# Patient Record
Sex: FEMALE | Race: WHITE | NOT HISPANIC OR LATINO | URBAN - METROPOLITAN AREA
[De-identification: names, ages, dates, MRNs, and addresses within clinical notes are randomized per-mention and may not be internally consistent; named-entity substitution may affect disease eponyms.]

---

## 2018-12-01 ENCOUNTER — EMERGENCY (EMERGENCY)
Facility: HOSPITAL | Age: 83
LOS: 1 days | Discharge: HOME | End: 2018-12-01
Attending: EMERGENCY MEDICINE | Admitting: EMERGENCY MEDICINE
Payer: MEDICARE

## 2018-12-01 VITALS
SYSTOLIC BLOOD PRESSURE: 197 MMHG | RESPIRATION RATE: 20 BRPM | HEART RATE: 70 BPM | TEMPERATURE: 98 F | OXYGEN SATURATION: 92 % | DIASTOLIC BLOOD PRESSURE: 84 MMHG

## 2018-12-01 DIAGNOSIS — Z79.52 LONG TERM (CURRENT) USE OF SYSTEMIC STEROIDS: ICD-10-CM

## 2018-12-01 DIAGNOSIS — Z90.12 ACQUIRED ABSENCE OF LEFT BREAST AND NIPPLE: Chronic | ICD-10-CM

## 2018-12-01 DIAGNOSIS — Z79.899 OTHER LONG TERM (CURRENT) DRUG THERAPY: ICD-10-CM

## 2018-12-01 DIAGNOSIS — S09.90XA UNSPECIFIED INJURY OF HEAD, INITIAL ENCOUNTER: ICD-10-CM

## 2018-12-01 DIAGNOSIS — Z88.8 ALLERGY STATUS TO OTHER DRUGS, MEDICAMENTS AND BIOLOGICAL SUBSTANCES: ICD-10-CM

## 2018-12-01 DIAGNOSIS — Z91.040 LATEX ALLERGY STATUS: ICD-10-CM
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
## 2018-12-01 DIAGNOSIS — E03.9 HYPOTHYROIDISM, UNSPECIFIED: ICD-10-CM

## 2018-12-01 DIAGNOSIS — Z92.3 PERSONAL HISTORY OF IRRADIATION: Chronic | ICD-10-CM

## 2018-12-01 DIAGNOSIS — I10 ESSENTIAL (PRIMARY) HYPERTENSION: ICD-10-CM
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
## 2018-12-01 DIAGNOSIS — E78.00 PURE HYPERCHOLESTEROLEMIA, UNSPECIFIED: ICD-10-CM
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
## 2018-12-01 DIAGNOSIS — Z90.49 ACQUIRED ABSENCE OF OTHER SPECIFIED PARTS OF DIGESTIVE TRACT: Chronic | ICD-10-CM
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
## 2018-12-01 PROCEDURE — 70486 CT MAXILLOFACIAL W/O DYE: CPT | Mod: 26

## 2018-12-01 PROCEDURE — 70450 CT HEAD/BRAIN W/O DYE: CPT

## 2018-12-01 PROCEDURE — 99284 EMERGENCY DEPT VISIT MOD MDM: CPT

## 2018-12-01 PROCEDURE — 90715 TDAP VACCINE 7 YRS/> IM: CPT

## 2018-12-01 PROCEDURE — 73562 X-RAY EXAM OF KNEE 3: CPT | Mod: 26,LT

## 2018-12-01 PROCEDURE — 70450 CT HEAD/BRAIN W/O DYE: CPT | Mod: 26

## 2018-12-01 PROCEDURE — 90471 IMMUNIZATION ADMIN: CPT

## 2018-12-01 PROCEDURE — 73562 X-RAY EXAM OF KNEE 3: CPT

## 2018-12-01 PROCEDURE — 99284 EMERGENCY DEPT VISIT MOD MDM: CPT | Mod: 25

## 2018-12-01 PROCEDURE — 70486 CT MAXILLOFACIAL W/O DYE: CPT

## 2018-12-01 RX ORDER — TETANUS TOXOID, REDUCED DIPHTHERIA TOXOID AND ACELLULAR PERTUSSIS VACCINE, ADSORBED 5; 2.5; 8; 8; 2.5 [IU]/.5ML; [IU]/.5ML; UG/.5ML; UG/.5ML; UG/.5ML
0.5 SUSPENSION INTRAMUSCULAR ONCE
Qty: 0 | Refills: 0 | Status: COMPLETED | OUTPATIENT
Start: 2018-12-01 | End: 2018-12-01

## 2018-12-01 RX ADMIN — TETANUS TOXOID, REDUCED DIPHTHERIA TOXOID AND ACELLULAR PERTUSSIS VACCINE, ADSORBED 0.5 MILLILITER(S): 5; 2.5; 8; 8; 2.5 SUSPENSION INTRAMUSCULAR at 17:16

## 2018-12-01 NOTE — ED ADULT NURSE REASSESSMENT NOTE - NS ED NURSE REASSESS COMMENT FT1
Patient refusing discharge vital signs, stating she is dressed and ready to leave and has been waiting for her discharge. Awake and alert, ambulating at discharge.

## 2018-12-01 NOTE — ED PROVIDER NOTE - OBJECTIVE STATEMENT
Patient is an 84yo female reporting mechanical fall on sidewalk today. Denies LOC, on Coumadin. Hematoma noted to left side of forehead and under left eye. Small laceration noted to forehead and left knee. Denies chest pain, shortness of breath, vomiting. AOx4, PERRL 84 yo hx of afib on coumadin s/p mechanical fall on sidewalk today. Denies LOC, Hematoma noted to left side of forehead and under left eye. Small abrasion noted to forehead and left knee. Denies chest pain, shortness of breath, vomiting. no other injuries, no neck pain, back pain, ambulatory at scene. Pt has not tried anything for symptoms, no other aggravating or relieving factors.

## 2018-12-01 NOTE — ED ADULT NURSE NOTE - INTERVENTIONS DEFINITIONS
Physically safe environment: no spills, clutter or unnecessary equipment/Stretcher in lowest position, wheels locked, appropriate side rails in place/Provide visual cue, wrist band, yellow gown, etc./Non-slip footwear when patient is off stretcher/Instruct patient to call for assistance

## 2018-12-01 NOTE — ED PROVIDER NOTE - PHYSICAL EXAMINATION
CONSTITUTIONAL: Well appearing, well nourished, awake, alert and in no apparent distress.  HEENT: Head is atraumatic. Eyes clear bilaterally, normal EOMI. Airway patent.  CARDIAC: Normal rate, regular rhythm.  Heart sounds S1, S2.   RESPIRATORY: Breath sounds clear and equal bilaterally. no tachypnea, respiratory distress.   GASTROINTESTINAL: Abdomen soft, non-tender, no guarding, distension.  MUSCULOSKELETAL: Spine appears normal, no midline spinal tenderness, range of motion is not limited, no muscle or joint tenderness. no bony tenderness. no JVD, peripheral edema.   NEUROLOGICAL: Alert and oriented, no focal deficits, no motor or sensory deficits.  SKIN: Skin normal color for race, warm, dry and intact. No evidence of rash.  PSYCHIATRIC: Alert and oriented to person, place, time/situation. normal mood and affect. no apparent risk to self or others. CONSTITUTIONAL: Well appearing, well nourished, awake, alert and in no apparent distress.  HEENT: Head is atraumatic. Eyes clear bilaterally, normal EOMI. Airway patent.  CARDIAC: Normal rate, regular rhythm.  Heart sounds S1, S2.   RESPIRATORY: Breath sounds clear and equal bilaterally. no tachypnea, respiratory distress.   GASTROINTESTINAL: Abdomen soft, non-tender, no guarding, distension.  MUSCULOSKELETAL: Spine appears normal, no midline spinal tenderness, range of motion is not limited, no muscle or joint tenderness. no bony tenderness. 1cm superficial forehead abrasion, abrasion to L knee   NEUROLOGICAL: Alert and oriented, no focal deficits, no motor or sensory deficits.  SKIN: Skin normal color for race, warm, dry and intact. No evidence of rash.  PSYCHIATRIC: Alert and oriented to person, place, time/situation. normal mood and affect. no apparent risk to self or others.

## 2018-12-01 NOTE — ED ADULT TRIAGE NOTE - OTHER COMPLAINTS
+head injury, pt is on coumadin, no LOC. Pt C/O pain to left knee and head. No vomiting, no vision change. Pt is A&O x3, able to speak coherently.

## 2018-12-01 NOTE — ED ADULT NURSE NOTE - OBJECTIVE STATEMENT
Patient is an 86yo female reporting mechanical fall on sidewalk today. Denies LOC, on Coumadin. Hematoma noted to left side of forehead and under left eye. Small laceration noted to forehead and left knee. Denies chest pain, shortness of breath, vomiting. AOx4, PERRL.

## 2019-01-01 ENCOUNTER — INPATIENT (INPATIENT)
Facility: HOSPITAL | Age: 84
LOS: 1 days | Discharge: HOME HEALTH | DRG: 312 | End: 2019-08-20
Attending: SPECIALIST | Admitting: SPECIALIST
Payer: MEDICARE

## 2019-01-01 ENCOUNTER — INPATIENT (INPATIENT)
Facility: HOSPITAL | Age: 84
LOS: 2 days | Discharge: HOME HEALTH | DRG: 312 | End: 2019-08-24
Attending: SPECIALIST | Admitting: SPECIALIST
Payer: MEDICARE

## 2019-01-01 VITALS
TEMPERATURE: 98 F | RESPIRATION RATE: 16 BRPM | OXYGEN SATURATION: 98 % | HEART RATE: 87 BPM | DIASTOLIC BLOOD PRESSURE: 72 MMHG | SYSTOLIC BLOOD PRESSURE: 135 MMHG

## 2019-01-01 VITALS
TEMPERATURE: 98 F | DIASTOLIC BLOOD PRESSURE: 79 MMHG | OXYGEN SATURATION: 95 % | HEART RATE: 86 BPM | RESPIRATION RATE: 16 BRPM | SYSTOLIC BLOOD PRESSURE: 124 MMHG

## 2019-01-01 VITALS — DIASTOLIC BLOOD PRESSURE: 74 MMHG | SYSTOLIC BLOOD PRESSURE: 160 MMHG

## 2019-01-01 VITALS — TEMPERATURE: 98 F

## 2019-01-01 DIAGNOSIS — E03.9 HYPOTHYROIDISM, UNSPECIFIED: ICD-10-CM

## 2019-01-01 DIAGNOSIS — R55 SYNCOPE AND COLLAPSE: ICD-10-CM

## 2019-01-01 DIAGNOSIS — I27.20 PULMONARY HYPERTENSION, UNSPECIFIED: ICD-10-CM
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
## 2019-01-01 DIAGNOSIS — Z79.899 OTHER LONG TERM (CURRENT) DRUG THERAPY: ICD-10-CM

## 2019-01-01 DIAGNOSIS — Z95.0 PRESENCE OF CARDIAC PACEMAKER: ICD-10-CM

## 2019-01-01 DIAGNOSIS — R68.84 JAW PAIN: ICD-10-CM

## 2019-01-01 DIAGNOSIS — Z91.040 LATEX ALLERGY STATUS: ICD-10-CM

## 2019-01-01 DIAGNOSIS — I10 ESSENTIAL (PRIMARY) HYPERTENSION: ICD-10-CM

## 2019-01-01 DIAGNOSIS — C50.919 MALIGNANT NEOPLASM OF UNSPECIFIED SITE OF UNSPECIFIED FEMALE BREAST: ICD-10-CM

## 2019-01-01 DIAGNOSIS — Z92.3 PERSONAL HISTORY OF IRRADIATION: Chronic | ICD-10-CM

## 2019-01-01 DIAGNOSIS — Z90.12 ACQUIRED ABSENCE OF LEFT BREAST AND NIPPLE: ICD-10-CM
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
## 2019-01-01 DIAGNOSIS — Z90.12 ACQUIRED ABSENCE OF LEFT BREAST AND NIPPLE: Chronic | ICD-10-CM

## 2019-01-01 DIAGNOSIS — I36.1 NONRHEUMATIC TRICUSPID (VALVE) INSUFFICIENCY: ICD-10-CM

## 2019-01-01 DIAGNOSIS — Z90.49 ACQUIRED ABSENCE OF OTHER SPECIFIED PARTS OF DIGESTIVE TRACT: Chronic | ICD-10-CM

## 2019-01-01 DIAGNOSIS — I44.0 ATRIOVENTRICULAR BLOCK, FIRST DEGREE: ICD-10-CM

## 2019-01-01 DIAGNOSIS — I07.1 RHEUMATIC TRICUSPID INSUFFICIENCY: ICD-10-CM

## 2019-01-01 DIAGNOSIS — I48.2 CHRONIC ATRIAL FIBRILLATION: ICD-10-CM

## 2019-01-01 DIAGNOSIS — Z85.3 PERSONAL HISTORY OF MALIGNANT NEOPLASM OF BREAST: ICD-10-CM

## 2019-01-01 DIAGNOSIS — F32.9 MAJOR DEPRESSIVE DISORDER, SINGLE EPISODE, UNSPECIFIED: ICD-10-CM

## 2019-01-01 DIAGNOSIS — I31.3 PERICARDIAL EFFUSION (NONINFLAMMATORY): ICD-10-CM

## 2019-01-01 DIAGNOSIS — Z87.891 PERSONAL HISTORY OF NICOTINE DEPENDENCE: ICD-10-CM

## 2019-01-01 DIAGNOSIS — Z91.09 OTHER ALLERGY STATUS, OTHER THAN TO DRUGS AND BIOLOGICAL SUBSTANCES: ICD-10-CM

## 2019-01-01 DIAGNOSIS — Z79.811 LONG TERM (CURRENT) USE OF AROMATASE INHIBITORS: ICD-10-CM

## 2019-01-01 DIAGNOSIS — I95.1 ORTHOSTATIC HYPOTENSION: ICD-10-CM

## 2019-01-01 DIAGNOSIS — Z79.01 LONG TERM (CURRENT) USE OF ANTICOAGULANTS: ICD-10-CM

## 2019-01-01 DIAGNOSIS — E78.5 HYPERLIPIDEMIA, UNSPECIFIED: ICD-10-CM

## 2019-01-01 DIAGNOSIS — E78.00 PURE HYPERCHOLESTEROLEMIA, UNSPECIFIED: ICD-10-CM

## 2019-01-01 DIAGNOSIS — I48.91 UNSPECIFIED ATRIAL FIBRILLATION: ICD-10-CM

## 2019-01-01 DIAGNOSIS — R29.6 REPEATED FALLS: ICD-10-CM

## 2019-01-01 DIAGNOSIS — M10.9 GOUT, UNSPECIFIED: ICD-10-CM

## 2019-01-01 DIAGNOSIS — Z98.890 OTHER SPECIFIED POSTPROCEDURAL STATES: ICD-10-CM
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
## 2019-01-01 LAB
ALBUMIN SERPL ELPH-MCNC: 3.7 G/DL — SIGNIFICANT CHANGE UP (ref 3.3–5)
ALBUMIN SERPL ELPH-MCNC: 3.7 G/DL — SIGNIFICANT CHANGE UP (ref 3.3–5)
ALP SERPL-CCNC: 73 U/L — SIGNIFICANT CHANGE UP (ref 40–120)
ALP SERPL-CCNC: 78 U/L — SIGNIFICANT CHANGE UP (ref 40–120)
ALT FLD-CCNC: 14 U/L — SIGNIFICANT CHANGE UP (ref 10–45)
ALT FLD-CCNC: 14 U/L — SIGNIFICANT CHANGE UP (ref 10–45)
ANION GAP SERPL CALC-SCNC: 10 MMOL/L — SIGNIFICANT CHANGE UP (ref 5–17)
ANION GAP SERPL CALC-SCNC: 10 MMOL/L — SIGNIFICANT CHANGE UP (ref 5–17)
ANION GAP SERPL CALC-SCNC: 11 MMOL/L — SIGNIFICANT CHANGE UP (ref 5–17)
ANION GAP SERPL CALC-SCNC: 7 MMOL/L — SIGNIFICANT CHANGE UP (ref 5–17)
ANION GAP SERPL CALC-SCNC: 8 MMOL/L — SIGNIFICANT CHANGE UP (ref 5–17)
ANION GAP SERPL CALC-SCNC: 8 MMOL/L — SIGNIFICANT CHANGE UP (ref 5–17)
ANION GAP SERPL CALC-SCNC: 9 MMOL/L — SIGNIFICANT CHANGE UP (ref 5–17)
APPEARANCE UR: CLEAR — SIGNIFICANT CHANGE UP
APTT BLD: 37 SEC — HIGH (ref 27.5–36.3)
APTT BLD: 37.6 SEC — HIGH (ref 27.5–36.3)
APTT BLD: 37.7 SEC — HIGH (ref 27.5–36.3)
APTT BLD: 37.9 SEC — HIGH (ref 27.5–36.3)
APTT BLD: 38.7 SEC — HIGH (ref 27.5–36.3)
APTT BLD: 40.3 SEC — HIGH (ref 27.5–36.3)
AST SERPL-CCNC: 21 U/L — SIGNIFICANT CHANGE UP (ref 10–40)
AST SERPL-CCNC: 23 U/L — SIGNIFICANT CHANGE UP (ref 10–40)
BASOPHILS # BLD AUTO: 0.06 K/UL — SIGNIFICANT CHANGE UP (ref 0–0.2)
BASOPHILS # BLD AUTO: 0.06 K/UL — SIGNIFICANT CHANGE UP (ref 0–0.2)
BASOPHILS NFR BLD AUTO: 0.7 % — SIGNIFICANT CHANGE UP (ref 0–2)
BASOPHILS NFR BLD AUTO: 0.8 % — SIGNIFICANT CHANGE UP (ref 0–2)
BILIRUB SERPL-MCNC: 1.4 MG/DL — HIGH (ref 0.2–1.2)
BILIRUB SERPL-MCNC: 1.8 MG/DL — HIGH (ref 0.2–1.2)
BILIRUB UR-MCNC: NEGATIVE — SIGNIFICANT CHANGE UP
BUN SERPL-MCNC: 19 MG/DL — SIGNIFICANT CHANGE UP (ref 7–23)
BUN SERPL-MCNC: 20 MG/DL — SIGNIFICANT CHANGE UP (ref 7–23)
BUN SERPL-MCNC: 22 MG/DL — SIGNIFICANT CHANGE UP (ref 7–23)
BUN SERPL-MCNC: 23 MG/DL — SIGNIFICANT CHANGE UP (ref 7–23)
BUN SERPL-MCNC: 24 MG/DL — HIGH (ref 7–23)
BUN SERPL-MCNC: 25 MG/DL — HIGH (ref 7–23)
BUN SERPL-MCNC: 29 MG/DL — HIGH (ref 7–23)
CALCIUM SERPL-MCNC: 10 MG/DL — SIGNIFICANT CHANGE UP (ref 8.4–10.5)
CALCIUM SERPL-MCNC: 10.2 MG/DL — SIGNIFICANT CHANGE UP (ref 8.4–10.5)
CALCIUM SERPL-MCNC: 10.3 MG/DL — SIGNIFICANT CHANGE UP (ref 8.4–10.5)
CALCIUM SERPL-MCNC: 10.6 MG/DL — HIGH (ref 8.4–10.5)
CALCIUM SERPL-MCNC: 10.6 MG/DL — HIGH (ref 8.4–10.5)
CALCIUM SERPL-MCNC: 10.9 MG/DL — HIGH (ref 8.4–10.5)
CALCIUM SERPL-MCNC: 9.7 MG/DL — SIGNIFICANT CHANGE UP (ref 8.4–10.5)
CHLORIDE SERPL-SCNC: 102 MMOL/L — SIGNIFICANT CHANGE UP (ref 96–108)
CHLORIDE SERPL-SCNC: 104 MMOL/L — SIGNIFICANT CHANGE UP (ref 96–108)
CHLORIDE SERPL-SCNC: 104 MMOL/L — SIGNIFICANT CHANGE UP (ref 96–108)
CHLORIDE SERPL-SCNC: 105 MMOL/L — SIGNIFICANT CHANGE UP (ref 96–108)
CHLORIDE SERPL-SCNC: 105 MMOL/L — SIGNIFICANT CHANGE UP (ref 96–108)
CHLORIDE SERPL-SCNC: 106 MMOL/L — SIGNIFICANT CHANGE UP (ref 96–108)
CHLORIDE SERPL-SCNC: 106 MMOL/L — SIGNIFICANT CHANGE UP (ref 96–108)
CK MB CFR SERPL CALC: 1.4 NG/ML — SIGNIFICANT CHANGE UP (ref 0–6.7)
CK SERPL-CCNC: 40 U/L — SIGNIFICANT CHANGE UP (ref 25–170)
CO2 SERPL-SCNC: 25 MMOL/L — SIGNIFICANT CHANGE UP (ref 22–31)
CO2 SERPL-SCNC: 26 MMOL/L — SIGNIFICANT CHANGE UP (ref 22–31)
CO2 SERPL-SCNC: 26 MMOL/L — SIGNIFICANT CHANGE UP (ref 22–31)
CO2 SERPL-SCNC: 27 MMOL/L — SIGNIFICANT CHANGE UP (ref 22–31)
CO2 SERPL-SCNC: 27 MMOL/L — SIGNIFICANT CHANGE UP (ref 22–31)
CO2 SERPL-SCNC: 28 MMOL/L — SIGNIFICANT CHANGE UP (ref 22–31)
CO2 SERPL-SCNC: 30 MMOL/L — SIGNIFICANT CHANGE UP (ref 22–31)
COLOR SPEC: YELLOW — SIGNIFICANT CHANGE UP
CREAT ?TM UR-MCNC: 99 MG/DL — SIGNIFICANT CHANGE UP
CREAT SERPL-MCNC: 0.95 MG/DL — SIGNIFICANT CHANGE UP (ref 0.5–1.3)
CREAT SERPL-MCNC: 0.96 MG/DL — SIGNIFICANT CHANGE UP (ref 0.5–1.3)
CREAT SERPL-MCNC: 0.97 MG/DL — SIGNIFICANT CHANGE UP (ref 0.5–1.3)
CREAT SERPL-MCNC: 1.01 MG/DL — SIGNIFICANT CHANGE UP (ref 0.5–1.3)
CREAT SERPL-MCNC: 1.06 MG/DL — SIGNIFICANT CHANGE UP (ref 0.5–1.3)
CREAT SERPL-MCNC: 1.07 MG/DL — SIGNIFICANT CHANGE UP (ref 0.5–1.3)
CREAT SERPL-MCNC: 1.26 MG/DL — SIGNIFICANT CHANGE UP (ref 0.5–1.3)
DIFF PNL FLD: NEGATIVE — SIGNIFICANT CHANGE UP
EOSINOPHIL # BLD AUTO: 0.26 K/UL — SIGNIFICANT CHANGE UP (ref 0–0.5)
EOSINOPHIL # BLD AUTO: 0.27 K/UL — SIGNIFICANT CHANGE UP (ref 0–0.5)
EOSINOPHIL NFR BLD AUTO: 3.2 % — SIGNIFICANT CHANGE UP (ref 0–6)
EOSINOPHIL NFR BLD AUTO: 3.3 % — SIGNIFICANT CHANGE UP (ref 0–6)
EXTRA LAVENDER TOP TUBE: SIGNIFICANT CHANGE UP
EXTRA SST TUBE: SIGNIFICANT CHANGE UP
GLUCOSE SERPL-MCNC: 100 MG/DL — HIGH (ref 70–99)
GLUCOSE SERPL-MCNC: 102 MG/DL — HIGH (ref 70–99)
GLUCOSE SERPL-MCNC: 118 MG/DL — HIGH (ref 70–99)
GLUCOSE SERPL-MCNC: 84 MG/DL — SIGNIFICANT CHANGE UP (ref 70–99)
GLUCOSE SERPL-MCNC: 84 MG/DL — SIGNIFICANT CHANGE UP (ref 70–99)
GLUCOSE SERPL-MCNC: 94 MG/DL — SIGNIFICANT CHANGE UP (ref 70–99)
GLUCOSE SERPL-MCNC: 95 MG/DL — SIGNIFICANT CHANGE UP (ref 70–99)
GLUCOSE UR QL: NEGATIVE — SIGNIFICANT CHANGE UP
HCT VFR BLD CALC: 35.1 % — SIGNIFICANT CHANGE UP (ref 34.5–45)
HCT VFR BLD CALC: 35.9 % — SIGNIFICANT CHANGE UP (ref 34.5–45)
HCT VFR BLD CALC: 35.9 % — SIGNIFICANT CHANGE UP (ref 34.5–45)
HCT VFR BLD CALC: 37 % — SIGNIFICANT CHANGE UP (ref 34.5–45)
HCT VFR BLD CALC: 37.5 % — SIGNIFICANT CHANGE UP (ref 34.5–45)
HCT VFR BLD CALC: 38.3 % — SIGNIFICANT CHANGE UP (ref 34.5–45)
HCT VFR BLD CALC: 39.5 % — SIGNIFICANT CHANGE UP (ref 34.5–45)
HGB BLD-MCNC: 11.5 G/DL — SIGNIFICANT CHANGE UP (ref 11.5–15.5)
HGB BLD-MCNC: 11.5 G/DL — SIGNIFICANT CHANGE UP (ref 11.5–15.5)
HGB BLD-MCNC: 11.7 G/DL — SIGNIFICANT CHANGE UP (ref 11.5–15.5)
HGB BLD-MCNC: 12 G/DL — SIGNIFICANT CHANGE UP (ref 11.5–15.5)
HGB BLD-MCNC: 12.2 G/DL — SIGNIFICANT CHANGE UP (ref 11.5–15.5)
HGB BLD-MCNC: 12.4 G/DL — SIGNIFICANT CHANGE UP (ref 11.5–15.5)
HGB BLD-MCNC: 12.6 G/DL — SIGNIFICANT CHANGE UP (ref 11.5–15.5)
IMM GRANULOCYTES NFR BLD AUTO: 0.4 % — SIGNIFICANT CHANGE UP (ref 0–1.5)
IMM GRANULOCYTES NFR BLD AUTO: 0.5 % — SIGNIFICANT CHANGE UP (ref 0–1.5)
INR BLD: 2.36 — HIGH (ref 0.88–1.16)
INR BLD: 2.37 — HIGH (ref 0.88–1.16)
INR BLD: 2.53 — HIGH (ref 0.88–1.16)
INR BLD: 2.57 — HIGH (ref 0.88–1.16)
INR BLD: 2.79 — HIGH (ref 0.88–1.16)
INR BLD: 2.88 — HIGH (ref 0.88–1.16)
INR BLD: 2.95 — HIGH (ref 0.88–1.16)
KETONES UR-MCNC: NEGATIVE — SIGNIFICANT CHANGE UP
LEUKOCYTE ESTERASE UR-ACNC: NEGATIVE — SIGNIFICANT CHANGE UP
LYMPHOCYTES # BLD AUTO: 1.47 K/UL — SIGNIFICANT CHANGE UP (ref 1–3.3)
LYMPHOCYTES # BLD AUTO: 1.52 K/UL — SIGNIFICANT CHANGE UP (ref 1–3.3)
LYMPHOCYTES # BLD AUTO: 17.2 % — SIGNIFICANT CHANGE UP (ref 13–44)
LYMPHOCYTES # BLD AUTO: 19.2 % — SIGNIFICANT CHANGE UP (ref 13–44)
MAGNESIUM SERPL-MCNC: 1.8 MG/DL — SIGNIFICANT CHANGE UP (ref 1.6–2.6)
MAGNESIUM SERPL-MCNC: 1.9 MG/DL — SIGNIFICANT CHANGE UP (ref 1.6–2.6)
MAGNESIUM SERPL-MCNC: 1.9 MG/DL — SIGNIFICANT CHANGE UP (ref 1.6–2.6)
MAGNESIUM SERPL-MCNC: 2.1 MG/DL — SIGNIFICANT CHANGE UP (ref 1.6–2.6)
MAGNESIUM SERPL-MCNC: 2.2 MG/DL — SIGNIFICANT CHANGE UP (ref 1.6–2.6)
MCHC RBC-ENTMCNC: 28.7 PG — SIGNIFICANT CHANGE UP (ref 27–34)
MCHC RBC-ENTMCNC: 28.9 PG — SIGNIFICANT CHANGE UP (ref 27–34)
MCHC RBC-ENTMCNC: 29 PG — SIGNIFICANT CHANGE UP (ref 27–34)
MCHC RBC-ENTMCNC: 29.3 PG — SIGNIFICANT CHANGE UP (ref 27–34)
MCHC RBC-ENTMCNC: 29.3 PG — SIGNIFICANT CHANGE UP (ref 27–34)
MCHC RBC-ENTMCNC: 29.5 PG — SIGNIFICANT CHANGE UP (ref 27–34)
MCHC RBC-ENTMCNC: 29.5 PG — SIGNIFICANT CHANGE UP (ref 27–34)
MCHC RBC-ENTMCNC: 31.9 GM/DL — LOW (ref 32–36)
MCHC RBC-ENTMCNC: 32 GM/DL — SIGNIFICANT CHANGE UP (ref 32–36)
MCHC RBC-ENTMCNC: 32.4 GM/DL — SIGNIFICANT CHANGE UP (ref 32–36)
MCHC RBC-ENTMCNC: 32.4 GM/DL — SIGNIFICANT CHANGE UP (ref 32–36)
MCHC RBC-ENTMCNC: 32.5 GM/DL — SIGNIFICANT CHANGE UP (ref 32–36)
MCHC RBC-ENTMCNC: 32.6 GM/DL — SIGNIFICANT CHANGE UP (ref 32–36)
MCHC RBC-ENTMCNC: 32.8 GM/DL — SIGNIFICANT CHANGE UP (ref 32–36)
MCV RBC AUTO: 89.7 FL — SIGNIFICANT CHANGE UP (ref 80–100)
MCV RBC AUTO: 90 FL — SIGNIFICANT CHANGE UP (ref 80–100)
MCV RBC AUTO: 90 FL — SIGNIFICANT CHANGE UP (ref 80–100)
MCV RBC AUTO: 90.1 FL — SIGNIFICANT CHANGE UP (ref 80–100)
MCV RBC AUTO: 90.2 FL — SIGNIFICANT CHANGE UP (ref 80–100)
MCV RBC AUTO: 90.4 FL — SIGNIFICANT CHANGE UP (ref 80–100)
MCV RBC AUTO: 90.5 FL — SIGNIFICANT CHANGE UP (ref 80–100)
MONOCYTES # BLD AUTO: 0.51 K/UL — SIGNIFICANT CHANGE UP (ref 0–0.9)
MONOCYTES # BLD AUTO: 0.66 K/UL — SIGNIFICANT CHANGE UP (ref 0–0.9)
MONOCYTES NFR BLD AUTO: 6.4 % — SIGNIFICANT CHANGE UP (ref 2–14)
MONOCYTES NFR BLD AUTO: 7.7 % — SIGNIFICANT CHANGE UP (ref 2–14)
NEUTROPHILS # BLD AUTO: 5.54 K/UL — SIGNIFICANT CHANGE UP (ref 1.8–7.4)
NEUTROPHILS # BLD AUTO: 6.06 K/UL — SIGNIFICANT CHANGE UP (ref 1.8–7.4)
NEUTROPHILS NFR BLD AUTO: 69.9 % — SIGNIFICANT CHANGE UP (ref 43–77)
NEUTROPHILS NFR BLD AUTO: 70.7 % — SIGNIFICANT CHANGE UP (ref 43–77)
NITRITE UR-MCNC: NEGATIVE — SIGNIFICANT CHANGE UP
NRBC # BLD: 0 /100 WBCS — SIGNIFICANT CHANGE UP (ref 0–0)
NT-PROBNP SERPL-SCNC: 400 PG/ML — HIGH (ref 0–300)
NT-PROBNP SERPL-SCNC: 663 PG/ML — HIGH (ref 0–300)
OSMOLALITY UR: 535 MOSMOL/KG — SIGNIFICANT CHANGE UP (ref 100–650)
PH UR: 7.5 — SIGNIFICANT CHANGE UP (ref 5–8)
PHOSPHATE SERPL-MCNC: 3.1 MG/DL — SIGNIFICANT CHANGE UP (ref 2.5–4.5)
PLATELET # BLD AUTO: 188 K/UL — SIGNIFICANT CHANGE UP (ref 150–400)
PLATELET # BLD AUTO: 199 K/UL — SIGNIFICANT CHANGE UP (ref 150–400)
PLATELET # BLD AUTO: 200 K/UL — SIGNIFICANT CHANGE UP (ref 150–400)
PLATELET # BLD AUTO: 208 K/UL — SIGNIFICANT CHANGE UP (ref 150–400)
PLATELET # BLD AUTO: 218 K/UL — SIGNIFICANT CHANGE UP (ref 150–400)
PLATELET # BLD AUTO: 219 K/UL — SIGNIFICANT CHANGE UP (ref 150–400)
PLATELET # BLD AUTO: 239 K/UL — SIGNIFICANT CHANGE UP (ref 150–400)
POTASSIUM SERPL-MCNC: 3.5 MMOL/L — SIGNIFICANT CHANGE UP (ref 3.5–5.3)
POTASSIUM SERPL-MCNC: 3.7 MMOL/L — SIGNIFICANT CHANGE UP (ref 3.5–5.3)
POTASSIUM SERPL-MCNC: 3.9 MMOL/L — SIGNIFICANT CHANGE UP (ref 3.5–5.3)
POTASSIUM SERPL-MCNC: 4 MMOL/L — SIGNIFICANT CHANGE UP (ref 3.5–5.3)
POTASSIUM SERPL-MCNC: 4 MMOL/L — SIGNIFICANT CHANGE UP (ref 3.5–5.3)
POTASSIUM SERPL-MCNC: 4.1 MMOL/L — SIGNIFICANT CHANGE UP (ref 3.5–5.3)
POTASSIUM SERPL-MCNC: 4.2 MMOL/L — SIGNIFICANT CHANGE UP (ref 3.5–5.3)
POTASSIUM SERPL-SCNC: 3.5 MMOL/L — SIGNIFICANT CHANGE UP (ref 3.5–5.3)
POTASSIUM SERPL-SCNC: 3.7 MMOL/L — SIGNIFICANT CHANGE UP (ref 3.5–5.3)
POTASSIUM SERPL-SCNC: 3.9 MMOL/L — SIGNIFICANT CHANGE UP (ref 3.5–5.3)
POTASSIUM SERPL-SCNC: 4 MMOL/L — SIGNIFICANT CHANGE UP (ref 3.5–5.3)
POTASSIUM SERPL-SCNC: 4 MMOL/L — SIGNIFICANT CHANGE UP (ref 3.5–5.3)
POTASSIUM SERPL-SCNC: 4.1 MMOL/L — SIGNIFICANT CHANGE UP (ref 3.5–5.3)
POTASSIUM SERPL-SCNC: 4.2 MMOL/L — SIGNIFICANT CHANGE UP (ref 3.5–5.3)
PROT SERPL-MCNC: 6.2 G/DL — SIGNIFICANT CHANGE UP (ref 6–8.3)
PROT SERPL-MCNC: 6.3 G/DL — SIGNIFICANT CHANGE UP (ref 6–8.3)
PROT UR-MCNC: NEGATIVE MG/DL — SIGNIFICANT CHANGE UP
PROTHROM AB SERPL-ACNC: 27.4 SEC — HIGH (ref 10–12.9)
PROTHROM AB SERPL-ACNC: 27.5 SEC — HIGH (ref 10–12.9)
PROTHROM AB SERPL-ACNC: 29.4 SEC — HIGH (ref 10–12.9)
PROTHROM AB SERPL-ACNC: 29.9 SEC — HIGH (ref 10–12.9)
PROTHROM AB SERPL-ACNC: 32.6 SEC — HIGH (ref 10–12.9)
PROTHROM AB SERPL-ACNC: 33.6 SEC — HIGH (ref 10–12.9)
PROTHROM AB SERPL-ACNC: 34.5 SEC — HIGH (ref 10–12.9)
RBC # BLD: 3.9 M/UL — SIGNIFICANT CHANGE UP (ref 3.8–5.2)
RBC # BLD: 3.97 M/UL — SIGNIFICANT CHANGE UP (ref 3.8–5.2)
RBC # BLD: 3.98 M/UL — SIGNIFICANT CHANGE UP (ref 3.8–5.2)
RBC # BLD: 4.09 M/UL — SIGNIFICANT CHANGE UP (ref 3.8–5.2)
RBC # BLD: 4.16 M/UL — SIGNIFICANT CHANGE UP (ref 3.8–5.2)
RBC # BLD: 4.27 M/UL — SIGNIFICANT CHANGE UP (ref 3.8–5.2)
RBC # BLD: 4.39 M/UL — SIGNIFICANT CHANGE UP (ref 3.8–5.2)
RBC # FLD: 14.5 % — SIGNIFICANT CHANGE UP (ref 10.3–14.5)
RBC # FLD: 14.6 % — HIGH (ref 10.3–14.5)
RBC # FLD: 14.7 % — HIGH (ref 10.3–14.5)
RBC # FLD: 15 % — HIGH (ref 10.3–14.5)
SODIUM SERPL-SCNC: 138 MMOL/L — SIGNIFICANT CHANGE UP (ref 135–145)
SODIUM SERPL-SCNC: 139 MMOL/L — SIGNIFICANT CHANGE UP (ref 135–145)
SODIUM SERPL-SCNC: 139 MMOL/L — SIGNIFICANT CHANGE UP (ref 135–145)
SODIUM SERPL-SCNC: 140 MMOL/L — SIGNIFICANT CHANGE UP (ref 135–145)
SODIUM SERPL-SCNC: 141 MMOL/L — SIGNIFICANT CHANGE UP (ref 135–145)
SODIUM SERPL-SCNC: 141 MMOL/L — SIGNIFICANT CHANGE UP (ref 135–145)
SODIUM SERPL-SCNC: 146 MMOL/L — HIGH (ref 135–145)
SODIUM UR-SCNC: 65 MMOL/L — SIGNIFICANT CHANGE UP
SP GR SPEC: 1.02 — SIGNIFICANT CHANGE UP (ref 1–1.03)
TROPONIN T SERPL-MCNC: <0.01 NG/ML — SIGNIFICANT CHANGE UP (ref 0–0.01)
TSH SERPL-MCNC: 0.41 UIU/ML — SIGNIFICANT CHANGE UP (ref 0.35–4.94)
UROBILINOGEN FLD QL: 0.2 E.U./DL — SIGNIFICANT CHANGE UP
WBC # BLD: 10.19 K/UL — SIGNIFICANT CHANGE UP (ref 3.8–10.5)
WBC # BLD: 10.46 K/UL — SIGNIFICANT CHANGE UP (ref 3.8–10.5)
WBC # BLD: 7.41 K/UL — SIGNIFICANT CHANGE UP (ref 3.8–10.5)
WBC # BLD: 7.92 K/UL — SIGNIFICANT CHANGE UP (ref 3.8–10.5)
WBC # BLD: 8.56 K/UL — SIGNIFICANT CHANGE UP (ref 3.8–10.5)
WBC # BLD: 8.92 K/UL — SIGNIFICANT CHANGE UP (ref 3.8–10.5)
WBC # BLD: 9.5 K/UL — SIGNIFICANT CHANGE UP (ref 3.8–10.5)
WBC # FLD AUTO: 10.19 K/UL — SIGNIFICANT CHANGE UP (ref 3.8–10.5)
WBC # FLD AUTO: 10.46 K/UL — SIGNIFICANT CHANGE UP (ref 3.8–10.5)
WBC # FLD AUTO: 7.41 K/UL — SIGNIFICANT CHANGE UP (ref 3.8–10.5)
WBC # FLD AUTO: 7.92 K/UL — SIGNIFICANT CHANGE UP (ref 3.8–10.5)
WBC # FLD AUTO: 8.56 K/UL — SIGNIFICANT CHANGE UP (ref 3.8–10.5)
WBC # FLD AUTO: 8.92 K/UL — SIGNIFICANT CHANGE UP (ref 3.8–10.5)
WBC # FLD AUTO: 9.5 K/UL — SIGNIFICANT CHANGE UP (ref 3.8–10.5)

## 2019-01-01 PROCEDURE — 70486 CT MAXILLOFACIAL W/O DYE: CPT | Mod: 26

## 2019-01-01 PROCEDURE — 97116 GAIT TRAINING THERAPY: CPT

## 2019-01-01 PROCEDURE — 83880 ASSAY OF NATRIURETIC PEPTIDE: CPT

## 2019-01-01 PROCEDURE — 84443 ASSAY THYROID STIM HORMONE: CPT

## 2019-01-01 PROCEDURE — 85730 THROMBOPLASTIN TIME PARTIAL: CPT

## 2019-01-01 PROCEDURE — 85025 COMPLETE CBC W/AUTO DIFF WBC: CPT

## 2019-01-01 PROCEDURE — 93880 EXTRACRANIAL BILAT STUDY: CPT | Mod: 26

## 2019-01-01 PROCEDURE — 85610 PROTHROMBIN TIME: CPT

## 2019-01-01 PROCEDURE — 93880 EXTRACRANIAL BILAT STUDY: CPT

## 2019-01-01 PROCEDURE — 99223 1ST HOSP IP/OBS HIGH 75: CPT

## 2019-01-01 PROCEDURE — 36415 COLL VENOUS BLD VENIPUNCTURE: CPT

## 2019-01-01 PROCEDURE — 85027 COMPLETE CBC AUTOMATED: CPT

## 2019-01-01 PROCEDURE — 71045 X-RAY EXAM CHEST 1 VIEW: CPT

## 2019-01-01 PROCEDURE — 97530 THERAPEUTIC ACTIVITIES: CPT

## 2019-01-01 PROCEDURE — 99285 EMERGENCY DEPT VISIT HI MDM: CPT

## 2019-01-01 PROCEDURE — 82570 ASSAY OF URINE CREATININE: CPT

## 2019-01-01 PROCEDURE — 70450 CT HEAD/BRAIN W/O DYE: CPT

## 2019-01-01 PROCEDURE — 82553 CREATINE MB FRACTION: CPT

## 2019-01-01 PROCEDURE — 70450 CT HEAD/BRAIN W/O DYE: CPT | Mod: 26

## 2019-01-01 PROCEDURE — 97161 PT EVAL LOW COMPLEX 20 MIN: CPT

## 2019-01-01 PROCEDURE — 99221 1ST HOSP IP/OBS SF/LOW 40: CPT

## 2019-01-01 PROCEDURE — 80048 BASIC METABOLIC PNL TOTAL CA: CPT

## 2019-01-01 PROCEDURE — 82550 ASSAY OF CK (CPK): CPT

## 2019-01-01 PROCEDURE — 93005 ELECTROCARDIOGRAM TRACING: CPT

## 2019-01-01 PROCEDURE — 84484 ASSAY OF TROPONIN QUANT: CPT

## 2019-01-01 PROCEDURE — 83935 ASSAY OF URINE OSMOLALITY: CPT

## 2019-01-01 PROCEDURE — 83735 ASSAY OF MAGNESIUM: CPT

## 2019-01-01 PROCEDURE — 93306 TTE W/DOPPLER COMPLETE: CPT

## 2019-01-01 PROCEDURE — 93010 ELECTROCARDIOGRAM REPORT: CPT

## 2019-01-01 PROCEDURE — 80053 COMPREHEN METABOLIC PANEL: CPT

## 2019-01-01 PROCEDURE — 93970 EXTREMITY STUDY: CPT

## 2019-01-01 PROCEDURE — 84300 ASSAY OF URINE SODIUM: CPT

## 2019-01-01 PROCEDURE — 70486 CT MAXILLOFACIAL W/O DYE: CPT

## 2019-01-01 PROCEDURE — 84100 ASSAY OF PHOSPHORUS: CPT

## 2019-01-01 PROCEDURE — 93306 TTE W/DOPPLER COMPLETE: CPT | Mod: 26

## 2019-01-01 PROCEDURE — 99285 EMERGENCY DEPT VISIT HI MDM: CPT | Mod: 25

## 2019-01-01 PROCEDURE — 82962 GLUCOSE BLOOD TEST: CPT

## 2019-01-01 PROCEDURE — 71045 X-RAY EXAM CHEST 1 VIEW: CPT | Mod: 26

## 2019-01-01 PROCEDURE — 81003 URINALYSIS AUTO W/O SCOPE: CPT

## 2019-01-01 PROCEDURE — 93970 EXTREMITY STUDY: CPT | Mod: 26

## 2019-01-01 RX ORDER — NEBIVOLOL HYDROCHLORIDE 5 MG/1
20 TABLET ORAL
Refills: 0 | Status: DISCONTINUED | OUTPATIENT
Start: 2019-01-01 | End: 2019-01-01

## 2019-01-01 RX ORDER — WARFARIN SODIUM 2.5 MG/1
1.25 TABLET ORAL ONCE
Refills: 0 | Status: DISCONTINUED | OUTPATIENT
Start: 2019-01-01 | End: 2019-01-01

## 2019-01-01 RX ORDER — ESCITALOPRAM OXALATE 10 MG/1
10 TABLET, FILM COATED ORAL DAILY
Refills: 0 | Status: DISCONTINUED | OUTPATIENT
Start: 2019-01-01 | End: 2019-01-01

## 2019-01-01 RX ORDER — LEVOTHYROXINE SODIUM 125 MCG
1 TABLET ORAL
Qty: 0 | Refills: 0 | DISCHARGE
Start: 2019-01-01

## 2019-01-01 RX ORDER — FOLIC ACID 0.8 MG
1 TABLET ORAL
Qty: 0 | Refills: 0 | DISCHARGE
Start: 2019-01-01

## 2019-01-01 RX ORDER — NEBIVOLOL HYDROCHLORIDE 5 MG/1
1 TABLET ORAL
Qty: 0 | Refills: 0 | DISCHARGE
Start: 2019-01-01

## 2019-01-01 RX ORDER — WARFARIN SODIUM 2.5 MG/1
1.25 TABLET ORAL AT BEDTIME
Refills: 0 | Status: DISCONTINUED | OUTPATIENT
Start: 2019-01-01 | End: 2019-01-01

## 2019-01-01 RX ORDER — WARFARIN SODIUM 2.5 MG/1
1.25 TABLET ORAL ONCE
Refills: 0 | Status: COMPLETED | OUTPATIENT
Start: 2019-01-01 | End: 2019-01-01

## 2019-01-01 RX ORDER — WARFARIN SODIUM 2.5 MG/1
1.25 TABLET ORAL AT BEDTIME
Refills: 0 | Status: COMPLETED | OUTPATIENT
Start: 2019-01-01 | End: 2019-01-01

## 2019-01-01 RX ORDER — MAGNESIUM OXIDE 400 MG ORAL TABLET 241.3 MG
800 TABLET ORAL ONCE
Refills: 0 | Status: COMPLETED | OUTPATIENT
Start: 2019-01-01 | End: 2019-01-01

## 2019-01-01 RX ORDER — HYDRALAZINE HCL 50 MG
5 TABLET ORAL ONCE
Refills: 0 | Status: COMPLETED | OUTPATIENT
Start: 2019-01-01 | End: 2019-01-01

## 2019-01-01 RX ORDER — WARFARIN SODIUM 2.5 MG/1
1 TABLET ORAL
Qty: 14 | Refills: 0
Start: 2019-01-01 | End: 2019-01-01

## 2019-01-01 RX ORDER — AMLODIPINE BESYLATE 2.5 MG/1
5 TABLET ORAL ONCE
Refills: 0 | Status: COMPLETED | OUTPATIENT
Start: 2019-01-01 | End: 2019-01-01

## 2019-01-01 RX ORDER — SODIUM CHLORIDE 9 MG/ML
1000 INJECTION INTRAMUSCULAR; INTRAVENOUS; SUBCUTANEOUS
Refills: 0 | Status: DISCONTINUED | OUTPATIENT
Start: 2019-01-01 | End: 2019-01-01

## 2019-01-01 RX ORDER — NEBIVOLOL HYDROCHLORIDE 5 MG/1
20 TABLET ORAL DAILY
Refills: 0 | Status: DISCONTINUED | OUTPATIENT
Start: 2019-01-01 | End: 2019-01-01

## 2019-01-01 RX ORDER — LETROZOLE 2.5 MG/1
2.5 TABLET, FILM COATED ORAL DAILY
Refills: 0 | Status: DISCONTINUED | OUTPATIENT
Start: 2019-01-01 | End: 2019-01-01

## 2019-01-01 RX ORDER — LEVOTHYROXINE SODIUM 125 MCG
88 TABLET ORAL DAILY
Refills: 0 | Status: DISCONTINUED | OUTPATIENT
Start: 2019-01-01 | End: 2019-01-01

## 2019-01-01 RX ORDER — ALLOPURINOL 300 MG
1 TABLET ORAL
Qty: 0 | Refills: 0 | DISCHARGE

## 2019-01-01 RX ORDER — ATORVASTATIN CALCIUM 80 MG/1
10 TABLET, FILM COATED ORAL AT BEDTIME
Refills: 0 | Status: DISCONTINUED | OUTPATIENT
Start: 2019-01-01 | End: 2019-01-01

## 2019-01-01 RX ORDER — LETROZOLE 2.5 MG/1
1 TABLET, FILM COATED ORAL
Qty: 0 | Refills: 0 | DISCHARGE
Start: 2019-01-01

## 2019-01-01 RX ORDER — POTASSIUM CHLORIDE 20 MEQ
40 PACKET (EA) ORAL EVERY 4 HOURS
Refills: 0 | Status: DISCONTINUED | OUTPATIENT
Start: 2019-01-01 | End: 2019-01-01

## 2019-01-01 RX ORDER — ALLOPURINOL 300 MG
100 TABLET ORAL DAILY
Refills: 0 | Status: DISCONTINUED | OUTPATIENT
Start: 2019-01-01 | End: 2019-01-01

## 2019-01-01 RX ORDER — HYDRALAZINE HCL 50 MG
10 TABLET ORAL ONCE
Refills: 0 | Status: COMPLETED | OUTPATIENT
Start: 2019-01-01 | End: 2019-01-01

## 2019-01-01 RX ORDER — BACITRACIN ZINC 500 UNIT/G
1 OINTMENT IN PACKET (EA) TOPICAL ONCE
Refills: 0 | Status: COMPLETED | OUTPATIENT
Start: 2019-01-01 | End: 2019-01-01

## 2019-01-01 RX ORDER — NEBIVOLOL HYDROCHLORIDE 5 MG/1
1 TABLET ORAL
Qty: 30 | Refills: 0
Start: 2019-01-01 | End: 2019-01-01

## 2019-01-01 RX ORDER — AMLODIPINE BESYLATE 2.5 MG/1
2.5 TABLET ORAL ONCE
Refills: 0 | Status: COMPLETED | OUTPATIENT
Start: 2019-01-01 | End: 2019-01-01

## 2019-01-01 RX ORDER — ESCITALOPRAM OXALATE 10 MG/1
1 TABLET, FILM COATED ORAL
Qty: 0 | Refills: 0 | DISCHARGE
Start: 2019-01-01

## 2019-01-01 RX ORDER — POTASSIUM CHLORIDE 20 MEQ
40 PACKET (EA) ORAL ONCE
Refills: 0 | Status: COMPLETED | OUTPATIENT
Start: 2019-01-01 | End: 2019-01-01

## 2019-01-01 RX ORDER — FOLIC ACID 0.8 MG
1 TABLET ORAL DAILY
Refills: 0 | Status: DISCONTINUED | OUTPATIENT
Start: 2019-01-01 | End: 2019-01-01

## 2019-01-01 RX ORDER — ATORVASTATIN CALCIUM 80 MG/1
1 TABLET, FILM COATED ORAL
Qty: 0 | Refills: 0 | DISCHARGE
Start: 2019-01-01

## 2019-01-01 RX ORDER — ALLOPURINOL 300 MG
1 TABLET ORAL
Qty: 0 | Refills: 0 | DISCHARGE
Start: 2019-01-01

## 2019-01-01 RX ORDER — NEBIVOLOL HYDROCHLORIDE 5 MG/1
10 TABLET ORAL DAILY
Refills: 0 | Status: DISCONTINUED | OUTPATIENT
Start: 2019-01-01 | End: 2019-01-01

## 2019-01-01 RX ORDER — MAGNESIUM SULFATE 500 MG/ML
2 VIAL (ML) INJECTION ONCE
Refills: 0 | Status: COMPLETED | OUTPATIENT
Start: 2019-01-01 | End: 2019-01-01

## 2019-01-01 RX ORDER — TRIAMTERENE/HYDROCHLOROTHIAZID 75 MG-50MG
1 TABLET ORAL DAILY
Refills: 0 | Status: DISCONTINUED | OUTPATIENT
Start: 2019-01-01 | End: 2019-01-01

## 2019-01-01 RX ORDER — WARFARIN SODIUM 2.5 MG/1
1 TABLET ORAL
Qty: 0 | Refills: 0 | DISCHARGE

## 2019-01-01 RX ORDER — TRIAMTERENE/HYDROCHLOROTHIAZID 75 MG-50MG
1 TABLET ORAL
Qty: 0 | Refills: 0 | DISCHARGE
Start: 2019-01-01

## 2019-01-01 RX ADMIN — NEBIVOLOL HYDROCHLORIDE 20 MILLIGRAM(S): 5 TABLET ORAL at 05:58

## 2019-01-01 RX ADMIN — Medication 1 APPLICATION(S): at 06:23

## 2019-01-01 RX ADMIN — Medication 100 MILLIGRAM(S): at 13:41

## 2019-01-01 RX ADMIN — NEBIVOLOL HYDROCHLORIDE 10 MILLIGRAM(S): 5 TABLET ORAL at 06:55

## 2019-01-01 RX ADMIN — Medication 1 TABLET(S): at 14:48

## 2019-01-01 RX ADMIN — WARFARIN SODIUM 1.25 MILLIGRAM(S): 2.5 TABLET ORAL at 21:39

## 2019-01-01 RX ADMIN — Medication 1 TABLET(S): at 12:51

## 2019-01-01 RX ADMIN — NEBIVOLOL HYDROCHLORIDE 20 MILLIGRAM(S): 5 TABLET ORAL at 06:35

## 2019-01-01 RX ADMIN — Medication 88 MICROGRAM(S): at 05:53

## 2019-01-01 RX ADMIN — Medication 1 MILLIGRAM(S): at 11:25

## 2019-01-01 RX ADMIN — Medication 1 TABLET(S): at 05:58

## 2019-01-01 RX ADMIN — ATORVASTATIN CALCIUM 10 MILLIGRAM(S): 80 TABLET, FILM COATED ORAL at 21:46

## 2019-01-01 RX ADMIN — LETROZOLE 2.5 MILLIGRAM(S): 2.5 TABLET, FILM COATED ORAL at 12:51

## 2019-01-01 RX ADMIN — NEBIVOLOL HYDROCHLORIDE 20 MILLIGRAM(S): 5 TABLET ORAL at 06:26

## 2019-01-01 RX ADMIN — AMLODIPINE BESYLATE 5 MILLIGRAM(S): 2.5 TABLET ORAL at 19:39

## 2019-01-01 RX ADMIN — WARFARIN SODIUM 1.25 MILLIGRAM(S): 2.5 TABLET ORAL at 22:56

## 2019-01-01 RX ADMIN — WARFARIN SODIUM 1.25 MILLIGRAM(S): 2.5 TABLET ORAL at 21:46

## 2019-01-01 RX ADMIN — NEBIVOLOL HYDROCHLORIDE 20 MILLIGRAM(S): 5 TABLET ORAL at 11:06

## 2019-01-01 RX ADMIN — ESCITALOPRAM OXALATE 10 MILLIGRAM(S): 10 TABLET, FILM COATED ORAL at 12:51

## 2019-01-01 RX ADMIN — Medication 1 TABLET(S): at 13:41

## 2019-01-01 RX ADMIN — NEBIVOLOL HYDROCHLORIDE 20 MILLIGRAM(S): 5 TABLET ORAL at 18:20

## 2019-01-01 RX ADMIN — WARFARIN SODIUM 1.25 MILLIGRAM(S): 2.5 TABLET ORAL at 21:42

## 2019-01-01 RX ADMIN — LETROZOLE 2.5 MILLIGRAM(S): 2.5 TABLET, FILM COATED ORAL at 14:48

## 2019-01-01 RX ADMIN — Medication 10 MILLIGRAM(S): at 14:00

## 2019-01-01 RX ADMIN — LETROZOLE 2.5 MILLIGRAM(S): 2.5 TABLET, FILM COATED ORAL at 13:42

## 2019-01-01 RX ADMIN — Medication 1 MILLIGRAM(S): at 14:48

## 2019-01-01 RX ADMIN — ESCITALOPRAM OXALATE 10 MILLIGRAM(S): 10 TABLET, FILM COATED ORAL at 13:42

## 2019-01-01 RX ADMIN — Medication 50 GRAM(S): at 17:31

## 2019-01-01 RX ADMIN — MAGNESIUM OXIDE 400 MG ORAL TABLET 800 MILLIGRAM(S): 241.3 TABLET ORAL at 08:06

## 2019-01-01 RX ADMIN — ESCITALOPRAM OXALATE 10 MILLIGRAM(S): 10 TABLET, FILM COATED ORAL at 11:25

## 2019-01-01 RX ADMIN — LETROZOLE 2.5 MILLIGRAM(S): 2.5 TABLET, FILM COATED ORAL at 11:08

## 2019-01-01 RX ADMIN — NEBIVOLOL HYDROCHLORIDE 20 MILLIGRAM(S): 5 TABLET ORAL at 17:30

## 2019-01-01 RX ADMIN — Medication 100 MILLIGRAM(S): at 11:07

## 2019-01-01 RX ADMIN — SODIUM CHLORIDE 75 MILLILITER(S): 9 INJECTION INTRAMUSCULAR; INTRAVENOUS; SUBCUTANEOUS at 16:55

## 2019-01-01 RX ADMIN — Medication 1 MILLIGRAM(S): at 13:42

## 2019-01-01 RX ADMIN — ESCITALOPRAM OXALATE 10 MILLIGRAM(S): 10 TABLET, FILM COATED ORAL at 14:48

## 2019-01-01 RX ADMIN — ATORVASTATIN CALCIUM 10 MILLIGRAM(S): 80 TABLET, FILM COATED ORAL at 21:42

## 2019-01-01 RX ADMIN — Medication 100 MILLIGRAM(S): at 11:25

## 2019-01-01 RX ADMIN — Medication 88 MICROGRAM(S): at 06:56

## 2019-01-01 RX ADMIN — ATORVASTATIN CALCIUM 10 MILLIGRAM(S): 80 TABLET, FILM COATED ORAL at 21:39

## 2019-01-01 RX ADMIN — Medication 40 MILLIEQUIVALENT(S): at 17:31

## 2019-01-01 RX ADMIN — SODIUM CHLORIDE 75 MILLILITER(S): 9 INJECTION INTRAMUSCULAR; INTRAVENOUS; SUBCUTANEOUS at 17:20

## 2019-01-01 RX ADMIN — AMLODIPINE BESYLATE 2.5 MILLIGRAM(S): 2.5 TABLET ORAL at 23:50

## 2019-01-01 RX ADMIN — Medication 1 MILLIGRAM(S): at 12:50

## 2019-01-01 RX ADMIN — Medication 88 MICROGRAM(S): at 05:58

## 2019-01-01 RX ADMIN — Medication 40 MILLIEQUIVALENT(S): at 08:05

## 2019-01-01 RX ADMIN — ESCITALOPRAM OXALATE 10 MILLIGRAM(S): 10 TABLET, FILM COATED ORAL at 11:08

## 2019-01-01 RX ADMIN — Medication 1 TABLET(S): at 06:26

## 2019-01-01 RX ADMIN — Medication 1 TABLET(S): at 06:23

## 2019-01-01 RX ADMIN — Medication 88 MICROGRAM(S): at 06:34

## 2019-01-01 RX ADMIN — Medication 1 TABLET(S): at 11:25

## 2019-01-01 RX ADMIN — ATORVASTATIN CALCIUM 10 MILLIGRAM(S): 80 TABLET, FILM COATED ORAL at 22:56

## 2019-01-01 RX ADMIN — SODIUM CHLORIDE 75 MILLILITER(S): 9 INJECTION INTRAMUSCULAR; INTRAVENOUS; SUBCUTANEOUS at 23:44

## 2019-01-01 RX ADMIN — Medication 100 MILLIGRAM(S): at 14:47

## 2019-01-01 RX ADMIN — LETROZOLE 2.5 MILLIGRAM(S): 2.5 TABLET, FILM COATED ORAL at 11:26

## 2019-01-01 RX ADMIN — Medication 5 MILLIGRAM(S): at 18:22

## 2019-01-01 RX ADMIN — Medication 100 MILLIGRAM(S): at 12:51

## 2019-01-01 RX ADMIN — Medication 88 MICROGRAM(S): at 06:23

## 2019-01-01 RX ADMIN — SODIUM CHLORIDE 100 MILLILITER(S): 9 INJECTION INTRAMUSCULAR; INTRAVENOUS; SUBCUTANEOUS at 14:56

## 2019-01-01 RX ADMIN — Medication 1 MILLIGRAM(S): at 11:08

## 2019-01-01 RX ADMIN — Medication 1 TABLET(S): at 11:07

## 2019-01-01 RX ADMIN — ATORVASTATIN CALCIUM 10 MILLIGRAM(S): 80 TABLET, FILM COATED ORAL at 21:57

## 2019-08-18 NOTE — ED PROVIDER NOTE - CLINICAL SUMMARY MEDICAL DECISION MAKING FREE TEXT BOX
pt w/hx htn, pacemaker - afib (on coumadin) biba s/p syncopal episode - was walking when collapsed - no prodrome symptoms, son caught her before she hit the ground, upon arrival asymptomatic, no sz activity, no incontinence, well appearing, hemodynamically stable, ekg non ischemic, trop x1 neg, cards fellow interrogated pacemaker - no ectopy/no arrhythmias, will give gentle ivf given slightly elevated bun, will admit to cards for syncope

## 2019-08-18 NOTE — ED PROVIDER NOTE - ATTENDING CONTRIBUTION TO CARE
Pt has h/o htn, afib s/p pm, who presents s/p syncopal episode while leaving restaurant, caught by her son. No preceding cp, sob, palp, dizziness, weakness, nausea, vomiting.     VITAL SIGNS: I have reviewed nursing notes and confirm.  CONSTITUTIONAL: Well-developed; well-nourished; in no acute distress.   SKIN:  warm and dry, no acute rash.   HEAD:  normocephalic, atraumatic.  EYES: EOM intact; conjunctiva and sclera clear.  ENT: No nasal discharge; airway clear.   NECK: Supple; non tender.  CARD: S1, S2 normal; no murmurs, gallops, or rubs. Regular rate and rhythm.   RESP:  Clear to auscultation b/l, no wheezes, rales or rhonchi.  ABD: Normal bowel sounds; soft; non-distended; non-tender; no guarding/ rebound.  EXT: Normal ROM. No clubbing, cyanosis or edema. 2+ pulses to b/l ue/le.  NEURO: Alert, oriented, grossly unremarkable  PSYCH: Cooperative, mood and affect appropriate.    EKG non-ischemic. PM interrogated and neg for events. No significant lab abnormalities. Trop wnl. Case d/w Dr. Hawkins; will admit to cardiac tele for monitoring. Pt has h/o htn, afib s/p pm, who presents s/p syncopal episode while leaving restaurant, caught by her son. No head trauma. No preceding cp, sob, palp, dizziness, weakness, nausea, vomiting, feeling hot/ flushed...     VITAL SIGNS: I have reviewed nursing notes and confirm.  CONSTITUTIONAL: Well-developed; well-nourished; in no acute distress.   SKIN:  warm and dry, no acute rash.   HEAD:  normocephalic, atraumatic.  EYES: EOM intact; conjunctiva and sclera clear.  ENT: No nasal discharge; airway clear.   NECK: Supple; non tender.  CARD: S1, S2 normal; no murmurs, gallops, or rubs. Regular rate and rhythm.   RESP:  Clear to auscultation b/l, no wheezes, rales or rhonchi.  ABD: Normal bowel sounds; soft; non-distended; non-tender; no guarding/ rebound.  EXT: Normal ROM. No clubbing, cyanosis or edema. 2+ pulses to b/l ue/le.  NEURO: Alert, oriented, grossly unremarkable  PSYCH: Cooperative, mood and affect appropriate.    EKG non-ischemic. PM interrogated and neg for events. No significant lab abnormalities. Trop wnl. Case d/w Dr. Hawkins; will admit to cardiac tele for monitoring.

## 2019-08-18 NOTE — H&P ADULT - NSHPREVIEWOFSYSTEMS_GEN_ALL_CORE
Gen: no weight loss, no fevers/chills  HEENT: +syncope, no headaches, no changes in hearing, no dysphagia  CV: no chest pain, no palpitations  Pulm: no shortness of breath, no cough  GI: No abdominal pain, no diarrhea, no blood in stool  : No dysuria, no frequency  Neuro: No numbness/tingling, no headaches  MSK: no muscle pain  Heme: no easy bruising  Psych: no history of psych disorders, no depression

## 2019-08-18 NOTE — ED ADULT TRIAGE NOTE - CHIEF COMPLAINT QUOTE
pt BIBEMS after syncope outside a restaurant with LOC for approx 15 seconds. hx pacemaker, pt is on warfarin. denies head injury, was caught by son prior to falling. fs 140 by EMS.

## 2019-08-18 NOTE — ED ADULT NURSE NOTE - NSIMPLEMENTINTERV_GEN_ALL_ED
Implemented All Fall with Harm Risk Interventions:  [place] to call system. Call bell, personal items and telephone within reach. Instruct patient to call for assistance. Room bathroom lighting operational. Non-slip footwear when patient is off stretcher. Physically safe environment: no spills, clutter or unnecessary equipment. Stretcher in lowest position, wheels locked, appropriate side rails in place. Provide visual cue, wrist band, yellow gown, etc. Monitor gait and stability. Monitor for mental status changes and reorient to person, place, and time. Review medications for side effects contributing to fall risk. Reinforce activity limits and safety measures with patient and family. Provide visual clues: red socks.

## 2019-08-18 NOTE — H&P ADULT - NSICDXPASTSURGICALHX_GEN_ALL_CORE_FT
PAST SURGICAL HISTORY:  H/O left mastectomy     S/P appendectomy     S/P radioactive iodine thyroid ablation

## 2019-08-18 NOTE — H&P ADULT - HISTORY OF PRESENT ILLNESS
Discuss at [facility] This is an 86F with PMHx HTN, HLD, TIA (30+ yrs ago without deficits), afib (Coumadin, s/p PPM), breast ca s/p L mastectomy 2016, hypothyroidism and recurrent falls who presents to [facility] after syncope. Patient reports being in usual state of health until today after going out to lunch with her sons. Patient was outside of restaurant after lunch when she was witnessed by sons to have collapsed for about 20 seconds. Patient reports she did not have any prodromal symptoms or presyncopal events, denies chest pain, palpitations, shortness of breath, tongue biting or bowel or bladder incontinence. Patient was caught by son before she fell and sustained no head injuries. Patient was BIBEMS, at which time she reported feeling back to usual state of health. She does report she has had several mechanical falls and falls where she fell out of bed multiple times. She denies prior syncope or loss of consciousness. No fevers or chills, no SOB, no palpitations, no other symptoms.    On admission: T98.1, HR 86, /79 --> 183/80 --> 141/96, RR 16 sating 95% on RA. PPM was interrogated in ED with no events. Patient was started on IV NS.

## 2019-08-18 NOTE — H&P ADULT - NSHPPHYSICALEXAM_GEN_ALL_CORE
.  VITAL SIGNS:  T(C): 36.7 (08-18-19 @ 15:51), Max: 36.7 (08-18-19 @ 13:04)  T(F): 98 (08-18-19 @ 15:51), Max: 98.1 (08-18-19 @ 14:35)  HR: 90 (08-18-19 @ 15:51) (86 - 90)  BP: 141/96 (08-18-19 @ 15:51) (124/79 - 183/80)  BP(mean): --  RR: 18 (08-18-19 @ 15:51) (16 - 18)  SpO2: 96% (08-18-19 @ 15:51) (95% - 96%)  Wt(kg): --    PHYSICAL EXAM:    Constitutional: WDWN resting comfortably in bed; NAD  Head: NC/AT  Eyes: PERRL, EOMI, clear conjunctiva  ENT: no nasal discharge; uvula midline, no oropharyngeal erythema or exudates; dry mucus membranes  Neck: supple; no JVD  Respiratory: CTA B/L; no W/R/R, no retractions  Cardiac: +S1/S2; irregularly irregular rate; no M/R/G  Gastrointestinal: soft, NT/ND; no rebound or guarding; +BSx4  Back: spine midline, no bony tenderness or step-offs; no CVAT B/L  Extremities: WWP, no clubbing or cyanosis; no peripheral edema  Musculoskeletal: Full ROM x4; no joint swelling or erythema  Vascular: 2+ radial and pedal pulses  Dermatologic: skin warm, dry and intact; no rashes, wounds, or scars  Lymphatic: no submandibular or cervical LAD  Neurologic: AAOx3; CNII-XII grossly intact; no focal deficits  Psychiatric: affect and characteristics of appearance, verbalizations, behaviors are appropriate

## 2019-08-18 NOTE — H&P ADULT - ASSESSMENT
This is an 86F with PMHx HTN, HLD, TIA (30+ yrs ago without deficits), afib (Coumadin, s/p PPM), breast ca s/p L mastectomy 2016, hypothyroidism and recurrent falls who presents for syncopal episode.

## 2019-08-18 NOTE — H&P ADULT - NSHPLABSRESULTS_GEN_ALL_CORE
.  LABS:                         11.7   7.92  )-----------( 199      ( 18 Aug 2019 14:20 )             35.9         146<H>  |  106  |  29<H>  ----------------------------<  118<H>  4.0   |  30  |  1.26    Ca    10.6<H>      18 Aug 2019 14:20    TPro  6.3  /  Alb  3.7  /  TBili  1.4<H>  /  DBili  x   /  AST  21  /  ALT  14  /  AlkPhos  73      PT/INR - ( 18 Aug 2019 14:20 )   PT: 29.4 sec;   INR: 2.53          PTT - ( 18 Aug 2019 14:20 )  PTT:37.0 sec  Urinalysis Basic - ( 18 Aug 2019 15:08 )    Color: Yellow / Appearance: Clear / S.020 / pH: x  Gluc: x / Ketone: NEGATIVE  / Bili: Negative / Urobili: 0.2 E.U./dL   Blood: x / Protein: NEGATIVE mg/dL / Nitrite: NEGATIVE   Leuk Esterase: NEGATIVE / RBC: x / WBC x   Sq Epi: x / Non Sq Epi: x / Bacteria: x      CARDIAC MARKERS ( 18 Aug 2019 14:20 )  x     / <0.01 ng/mL / x     / x     / x          Serum Pro-Brain Natriuretic Peptide: 663 pg/mL ( @ 14:20)    RADIOLOGY, EKG & ADDITIONAL TESTS: Reviewed.   - EKG afib, first degree AV block, , TWI aVL, V4-V6  - CXR clear

## 2019-08-18 NOTE — H&P ADULT - PROBLEM SELECTOR PLAN 3
- Patient with history of afib on Coumadin  - Currently rate controlled  - Continue home medications Coumadin 2.5mg Tuesday and Thursday, 1.25mg MWF Sat and Sun and check INR daily  - Continue home Bystolic 20mg BID

## 2019-08-18 NOTE — PROVIDER CONTACT NOTE (OTHER) - SITUATION
Patient came up from ER with sbp 194, provider notified and requested to give home dose of 20mg bystolic PO early, after giving the medication and rechecking bp it is still elevated at 188/89. HR 86

## 2019-08-18 NOTE — H&P ADULT - NSHPSOCIALHISTORY_GEN_ALL_CORE
EtOH social  Cigarettes 5 pack year smoking history, quit >10 years ago  Drugs denies  Lives alone

## 2019-08-18 NOTE — CHART NOTE - NSCHARTNOTEFT_GEN_A_CORE
Division of Electrophysiology  Device Interrogation Report    Interrogation of: [x] Pacemaker [ ] Defibrillator    Indication for Device: Afib w/SSS  Indication for Interrogation: Syncope    Implanting Physician: Dr. Alvarez ([phone])  Implant Date: 10/12/2010  Last Interrogation: 4/4/2019    : [ ] [employer] [x] [place] [ ] [place] [employer] [ ] [employer]  Model: [place] XL (Single Tendril RV Lead)  Mode: VVI-R ()  Underlying Rhythm:     Pacemaker Dependent: Yes (56% )    Battery Status: 0.25 years    Lead Parameters:  		  RV lead:    Sense:  3.3 mV.         Threshold:  0.875 V at 0.4 ms.         Impedance:  342 ohms    Comments / Events: No events. Normally functioning single-chamber RV PPM.    Changes Made: None.    Plan: Further evaluation of syncope as per ED team. If discharged, pt instructed to f/u w/gen cards Dr. Hawkins and EP Dr. Alvarez.    --  Lamin Roberto MD  Cardiology Fellow PGY-5  [phone]

## 2019-08-18 NOTE — ED PROVIDER NOTE - CARE PLAN
Principal Discharge DX:	Syncope  Secondary Diagnosis:	Pacemaker  Secondary Diagnosis:	HTN (hypertension)

## 2019-08-18 NOTE — ED PROVIDER NOTE - OBJECTIVE STATEMENT
The pt is a 87 y/o F, BIBA w/son at bedside, s/p syncopal episode PTA. Pt states that she wasn't feeling well today - no energy, had brunch and was walking out of the restaurant w/her son, when suddenly collapsed - son caught her - she never hit the ground, was out for about 20 sec, then came back. Currently asymptomatic. has pace maker in place - on coumadin.  Denies any cp, sob, n/v/d, abd pain, h/a, head injury, fevers, chills

## 2019-08-18 NOTE — H&P ADULT - NSICDXPASTMEDICALHX_GEN_ALL_CORE_FT
PAST MEDICAL HISTORY:  Afib     Breast cancer     High cholesterol     HTN (hypertension)     Hypothyroid     Pacemaker

## 2019-08-18 NOTE — H&P ADULT - PROBLEM SELECTOR PLAN 1
- Patient presenting with syncopal episode in setting of poor PO intake suspected secondary to dehydration or orthostatic hypotension. Patient with elevated BUN with normal Cr and UA with specific gravity 1.020 pointing to dehydration. However given patient's underlying cardiac disease will monitor on cardiac tele to r/o arrhythmias  - PPM interrogated with no events identified. EKG with TWI aVL and V4-V6 although unclear if those are present before  - Troponin negative, pro- with no clinical s/s fluid overload  - Continue gentle hydration 75cc/hr NS  - Echocardiogram in AM to assess cardiac function  - Continue home medications as below  - Repeat EKG in AM  - Avoid QTc prolonging medications  - Although patient had no head trauma, will f/u CT head to r/o intracranial causes of syncope  - PT consulted

## 2019-08-18 NOTE — H&P ADULT - PROBLEM SELECTOR PLAN 5
- Hx hypothyroidism, continue home Synthroid 88micrograms daily  - f/u TSH - Hx hypothyroidism, TSH WNL on this admission, continue home Synthroid 88micrograms daily

## 2019-08-18 NOTE — H&P ADULT - PROBLEM SELECTOR PLAN 2
- Patient with history of HTN reporting labile BPs outpatient  - Continue home triamterene/HCTZ 37.5/25mg daily and Bystolic 20mg BID daily

## 2019-08-18 NOTE — ED ADULT NURSE NOTE - OBJECTIVE STATEMENT
Pt BIBA for "syncopal episode for 15 seconds." AA&Ox4 upon assessment. Breathing on room air. A-fib on cardiac monitor. Pt with history of a-fib, left breast mastectomy and pacemaker. On blood thinners. Denies hitting head. States, "I just passed out while I was walking home. My son caught me and I was out for about 15-20 seconds."

## 2019-08-19 NOTE — PROVIDER CONTACT NOTE (OTHER) - ASSESSMENT
Patient asymptomatic, otherwise WNL. No distress, no complaints. NS@ 75ml/hr- patient lungs clear.
Asymptomatic. AAOx4, no complaints of pain, negative neuro assessment. Son at bedside.

## 2019-08-19 NOTE — PROVIDER CONTACT NOTE (OTHER) - SITUATION
Patient admitted yesterday for syncope and htn. Patient's VS: bp 220/99 HR: 80 RR 16: 97% on room air.  Patient is asymptomatic. Patient has gotten her morning dose of bystolic 20mg at 0600.

## 2019-08-19 NOTE — PHYSICAL THERAPY INITIAL EVALUATION ADULT - ADDITIONAL COMMENTS
apartment, elevator, no steps, independent prior to arrival, no hha, 4-5 falls in past year (mechanical and non-mechanical falls)

## 2019-08-19 NOTE — PHYSICAL THERAPY INITIAL EVALUATION ADULT - CRITERIA FOR SKILLED THERAPEUTIC INTERVENTIONS
anticipated discharge recommendation/impairments found/functional limitations in following categories/rehab potential/therapy frequency

## 2019-08-19 NOTE — PHYSICAL THERAPY INITIAL EVALUATION ADULT - PERTINENT HX OF CURRENT PROBLEM, REHAB EVAL
86F who presents to [facility] after syncope. After going out to lunch with her sons. Patient was outside of restaurant after lunch when she was witnessed by sons to have collapsed for about 20 seconds. Patient was caught by son before she fell and sustained no head injuries. Patient was BIBEMS, at which time she reported feeling back to usual state of health. She does report she has had several mechanical falls and falls where she fell out of bed multiple times.

## 2019-08-19 NOTE — PROGRESS NOTE ADULT - PROBLEM SELECTOR PLAN 2
Patient with history of HTN reporting labile BPs outpatient  - Continue home triamterene/HCTZ 37.5/25mg daily and Bystolic 20mg BID daily  - monitor BP

## 2019-08-19 NOTE — PROGRESS NOTE ADULT - PROBLEM SELECTOR PLAN 1
Patient presented with syncopal episode in setting of poor PO intake suspected secondary to dehydration vs orthostatic hypotension. Patient with elevated BUN with normal Cr and UA with specific gravity 1.020 pointing to dehydration, both improved with hydration. Given patient's underlying cardiac disease will monitor on cardiac tele to r/o arrhythmias  - PPM interrogated with no events identified. EKG with TWI aVL and V4-V6 although unclear if those were present before  - Troponin negative, pro- with no clinical s/s fluid overload  - F/u echocardiogram to assess cardiac function  - Continue home medications as below  - F/u EKG in AM - previous EKG with prolonged QTc  - Avoid QTc prolonging medications  - Although patient had no head trauma, will f/u CT head to r/o intracranial causes of syncope  - PT consulted, appreciate recs Patient presented with syncopal episode in setting of poor PO intake suspected secondary to dehydration vs orthostatic hypotension. Patient with elevated BUN with normal Cr and UA with specific gravity 1.020 pointing to dehydration, both improved with hydration. Given patient's underlying cardiac disease will monitor on cardiac tele to r/o arrhythmias  - PPM interrogated with no events identified. EKG with TWI aVL and V4-V6 although unclear if those were present before  - Troponin negative, pro- with no clinical s/s fluid overload  - F/u echocardiogram to assess cardiac function  - Continue home medications as below  - F/u EKG in AM - previous EKG with prolonged QTc  - Avoid QTc prolonging medications  - Although patient had no head trauma, CT head negative for intracranial cause of syncope  - B/l LE dopplers negative for DVT  - PT consulted, appreciate recs

## 2019-08-19 NOTE — PROGRESS NOTE ADULT - PROBLEM SELECTOR PLAN 3
Patient with history of Afib on Coumadin  - Currently rate controlled  - Continue home medications Coumadin 2.5mg Tuesday and Thursday, 1.25mg MWF Sat and Sun and check INR daily  - Continue home Bystolic 20mg BID

## 2019-08-19 NOTE — PROGRESS NOTE ADULT - SUBJECTIVE AND OBJECTIVE BOX
Interval Events:  Patient seen and examined at bedside.    Patient is a 86y old  Female who presents with a chief complaint of syncope (18 Aug 2019 16:17)  Feels better this a.m. but says she doesn't feel "right."      PAST MEDICAL & SURGICAL HISTORY:  Pacemaker  HTN (hypertension)  Breast cancer  Hypothyroid  High cholesterol  Afib  S/P radioactive iodine thyroid ablation  S/P appendectomy  H/O left mastectomy      MEDICATIONS:  Pulmonary:    Antimicrobials:    Anticoagulants:    Cardiac:  nebivolol 20 milliGRAM(s) Oral <User Schedule>  triamterene 37.5 mG/hydrochlorothiazide 25 mG Tablet 1 Tablet(s) Oral daily      Allergies    adhesives (Unknown)  latex (Rash; Urticaria)  No Known Drug Allergies    Intolerances        Vital Signs Last 24 Hrs  T(C): 36.2 (19 Aug 2019 06:02), Max: 36.9 (18 Aug 2019 17:48)  T(F): 97.1 (19 Aug 2019 06:02), Max: 98.5 (18 Aug 2019 17:48)  HR: 78 (19 Aug 2019 05:58) (72 - 90)  BP: 191/91 (19 Aug 2019 05:58) (124/79 - 194/93)  BP(mean): --  RR: 17 (19 Aug 2019 05:58) (16 - 18)  SpO2: 97% (19 Aug 2019 05:58) (95% - 98%)    Lungs- Clear b/l;  CV-  RRR S1S2;  Ext- no edema.    LABS:      CBC Full  -  ( 19 Aug 2019 05:47 )  WBC Count : 9.50 K/uL  RBC Count : 4.39 M/uL  Hemoglobin : 12.6 g/dL  Hematocrit : 39.5 %  Platelet Count - Automated : 219 K/uL  Mean Cell Volume : 90.0 fl  Mean Cell Hemoglobin : 28.7 pg  Mean Cell Hemoglobin Concentration : 31.9 gm/dL  Auto Neutrophil # : x  Auto Lymphocyte # : x  Auto Monocyte # : x  Auto Eosinophil # : x  Auto Basophil # : x  Auto Neutrophil % : x  Auto Lymphocyte % : x  Auto Monocyte % : x  Auto Eosinophil % : x  Auto Basophil % : x    08-    141  |  104  |  25<H>  ----------------------------<  95  3.5   |  28  |  0.97    Ca    10.6<H>      19 Aug 2019 05:47  Mg     1.9     -    TPro  6.3  /  Alb  3.7  /  TBili  1.4<H>  /  DBili  x   /  AST  21  /  ALT  14  /  AlkPhos  73  -    PT/INR - ( 19 Aug 2019 05:47 )   PT: 27.4 sec;   INR: 2.36          PTT - ( 19 Aug 2019 05:47 )  PTT:38.7 sec      Urinalysis Basic - ( 18 Aug 2019 15:08 )    Color: Yellow / Appearance: Clear / S.020 / pH: x  Gluc: x / Ketone: NEGATIVE  / Bili: Negative / Urobili: 0.2 E.U./dL   Blood: x / Protein: NEGATIVE mg/dL / Nitrite: NEGATIVE   Leuk Esterase: NEGATIVE / RBC: x / WBC x   Sq Epi: x / Non Sq Epi: x / Bacteria: x                  RADIOLOGY & ADDITIONAL STUDIES (The following images were personally reviewed):        Assessment and Plan:  Events reviewed with patient.  She had a clear syncopal episode yesterday-  however she had two mechanical falls in the past two weeks.  EP saw pt and interrogated pacemaker-  okay.  She needs CT head;  she needs echo;  she needs dopplers venous lower ext.  She has been undergoing PT as outpt for ongoing knee problems and apparently was scheduled for back x-rays today to see if spine is source of her problem.  Was hydrated overnight.  BP high this am so bystolic and dyazide were resumed.   Please have PT see patient.  Discussed with PA staff.

## 2019-08-19 NOTE — PROGRESS NOTE ADULT - SUBJECTIVE AND OBJECTIVE BOX
OVERNIGHT EVENTS: No acute events    SUBJECTIVE: Pt seen and examined at bedside. She endorsed little sleep due to a noisy roommate. Endorsing chronic urinary frequency, particularly at night. Otherwise denies acute complaints. Denies fevers, chills, chest pain, shortness of breath, abdominal pain, n/v/d/c.     Vital Signs Last 12 Hrs  T(F): 97.8 (19 @ 08:35), Max: 97.8 (19 @ 08:35)  HR: 75 (19 @ 07:00) (75 - 80)  BP: 184/84 (19 @ 07:00) (171/78 - 191/91)  BP(mean): --  RR: 17 (19 @ 07:00) (17 - 17)  SpO2: 98% (19 @ 07:00) (97% - 98%)  I&O's Summary    19 Aug 2019 07:01  -  19 Aug 2019 10:25  --------------------------------------------------------  IN: 180 mL / OUT: 0 mL / NET: 180 mL      PHYSICAL EXAM:  Constitutional: Adult Female, NAD, comfortable in bed.  HEENT: NC/AT, PERRLA, EOMI, no conjunctival pallor or scleral icterus, mildly dry MM  Neck: Supple, no JVD  Respiratory: Normal rate, rhythm, depth, effort. CTAB. No w/r/r.   Cardiovascular: Irregularly irregular, normal S1 and S2, no m/r/g.   Gastrointestinal: +BS, soft NTND, no guarding or rebound tenderness, no palpable masses   Extremities: wwp; no cyanosis, clubbing or edema.   Vascular: Pulses equal and strong throughout.   Neurological: AAOx3, no CN deficits, strength and sensation intact throughout.   Skin: No gross skin abnormalities or rashes        LABS:                        12.6   9.50  )-----------( 219      ( 19 Aug 2019 05:47 )             39.5     08-    141  |  104  |  25<H>  ----------------------------<  95  3.5   |  28  |  0.97    Ca    10.6<H>      19 Aug 2019 05:47  Mg     1.9     -    TPro  6.3  /  Alb  3.7  /  TBili  1.4<H>  /  DBili  x   /  AST  21  /  ALT  14  /  AlkPhos  73  08-18    PT/INR - ( 19 Aug 2019 05:47 )   PT: 27.4 sec;   INR: 2.36          PTT - ( 19 Aug 2019 05:47 )  PTT:38.7 sec  Urinalysis Basic - ( 18 Aug 2019 15:08 )    Color: Yellow / Appearance: Clear / S.020 / pH: x  Gluc: x / Ketone: NEGATIVE  / Bili: Negative / Urobili: 0.2 E.U./dL   Blood: x / Protein: NEGATIVE mg/dL / Nitrite: NEGATIVE   Leuk Esterase: NEGATIVE / RBC: x / WBC x   Sq Epi: x / Non Sq Epi: x / Bacteria: x        RADIOLOGY & ADDITIONAL TESTS:        MEDICATIONS  (STANDING):  allopurinol 100 milliGRAM(s) Oral daily  atorvastatin 10 milliGRAM(s) Oral at bedtime  escitalopram 10 milliGRAM(s) Oral daily  folic acid 1 milliGRAM(s) Oral daily  letrozole 2.5 milliGRAM(s) Oral daily  levothyroxine 88 MICROGram(s) Oral daily  multivitamin 1 Tablet(s) Oral daily  nebivolol 20 milliGRAM(s) Oral <User Schedule>  triamterene 37.5 mG/hydrochlorothiazide 25 mG Tablet 1 Tablet(s) Oral daily    MEDICATIONS  (PRN):

## 2019-08-19 NOTE — PROGRESS NOTE ADULT - ASSESSMENT
This is an 86F with PMHx HTN, HLD, TIA (30+ yrs ago without deficits), afib (Coumadin, s/p PPM), breast ca s/p L mastectomy 2016, hypothyroidism and recurrent falls who presents for syncopal episode. EP interrogated pacemaker without acute event findings, now pending CT head, echo, LE dopplers, PT evaluation. This is an 86F with PMHx HTN, HLD, TIA (30+ yrs ago without deficits), afib (Coumadin, s/p PPM), breast ca s/p L mastectomy 2016, hypothyroidism and recurrent falls who presents for syncopal episode. EP interrogated pacemaker without acute event findings, CT head negative, LE dopplers negative for DVT, now pending echo, PT evaluation.

## 2019-08-19 NOTE — PROVIDER CONTACT NOTE (OTHER) - ACTION/TREATMENT ORDERED:
Norvasc 5mg PO X1 dose ordered. Continue to monitor. Keep NS@75ml/hr.
MD notified, no new order at this time. Continue to monitor. Orders to follow.

## 2019-08-20 NOTE — DISCHARGE NOTE NURSING/CASE MANAGEMENT/SOCIAL WORK - NSDCDPATPORTLINK_GEN_ALL_CORE
You can access the [facility] Portal, offered by [facility], by registering with the following website: [email or web address]

## 2019-08-20 NOTE — DISCHARGE NOTE NURSING/CASE MANAGEMENT/SOCIAL WORK - NSSCCARECORD_GEN_ALL_CORE
Pt presents to ED with c/o headache and high blood pressure. +nausea without emesis, light sensitivity, & lightheaded. Onset of symptoms 1800. Denies CP or unilateral weakness. Per pt, at home /119. He takes Lisinopril 30 mg daily in am.  Pt reports a similar episode approx 1 month ago, was not evaluated at that time.    [facility]

## 2019-08-20 NOTE — DISCHARGE NOTE PROVIDER - NSDCCPTREATMENT_GEN_ALL_CORE_FT
PRINCIPAL PROCEDURE  Procedure: Transthoracic echocardiogram  Findings and Treatment: 1. Normal left and right ventricular size and systolic function.   2. Severely dilated left atrium.   3. Mild-to-moderate tricuspid regurgitation.   4. Mild pulmonary hypertension, PASP is 48.6 mmHg.   5. Small pericardial effusion without evidence of cardiac tamponade.

## 2019-08-20 NOTE — DISCHARGE NOTE PROVIDER - HOSPITAL COURSE
86F with PMH HTN, HLD, TIA (30+ years ago without deficits), A-fib (on Coumadin, s/p PPM), breast cancer s/p L mastectomy 2016 who presented for syncopal episode. EP interrogated pacemaker which did not reveal any acute events.  CT head negative, LE negative for DVT. PT evaluated the patient, recommended home with home PT. Troponin negative x2. INR has been therapeutic on current dose of coumadin. Patient has not had any syncopal episodes while being here.         On the day of discharge, pt was seen and examined. Symptoms have improved. Vital signs are stable. Patient is medically optimized and hemodynamically stable for discharge. Return precautions discussed, medication teach back done and importance of physician follow up emphasized. Patient verbalized understanding and agrees with plan. 86F with PMH HTN, HLD, TIA (30+ years ago without deficits), A-fib (on Coumadin, s/p PPM), breast cancer s/p L mastectomy 2016 who presented for syncopal episode. EP interrogated pacemaker which did not reveal any acute events.  CT head negative, LE negative for DVT. PT evaluated the patient, recommended home with home PT. Troponin negative x2. INR has been therapeutic on current dose of coumadin. Patient has not had any syncopal episodes while being here. Telemetry did not reveal any acute changes. Echo performed which revealed ***         On the day of discharge, pt was seen and examined. Symptoms have improved. Vital signs are stable. Patient is medically optimized and hemodynamically stable for discharge. Return precautions discussed, medication teach back done and importance of physician follow up emphasized. Patient verbalized understanding and agrees with plan. 86F with PMH HTN, HLD, TIA (30+ years ago without deficits), A-fib (on Coumadin, s/p PPM), breast cancer s/p L mastectomy 2016 who presented for syncopal episode. EP interrogated pacemaker which did not reveal any acute events.  CT head negative, LE negative for DVT. PT evaluated the patient, recommended home with home PT. Troponin negative x2. INR has been therapeutic on current dose of coumadin. Patient has not had any syncopal episodes while being here. Telemetry did not reveal any acute changes. Echo performed which revealed severely dilated LA (c/w diagnosis of atrial fibrillation), normal LV and RV systolic function, mild to moderate TR, mild pulmonary HTN, and small pericardial effusion without evidence of tamponade.         On the day of discharge, pt was seen and examined. Symptoms have improved. Vital signs are stable. Patient is medically optimized and hemodynamically stable for discharge. Return precautions discussed, medication teach back done and importance of physician follow up emphasized. Patient verbalized understanding and agrees with plan.

## 2019-08-20 NOTE — DISCHARGE NOTE PROVIDER - CARE PROVIDER_API CALL
Liliana Hawkins)  Cardiovascular Medicine  [address], [address]  [place] [address]  Phone: [phone]  Fax: [phone]  Follow Up Time:

## 2019-08-21 NOTE — ED PROVIDER NOTE - SKIN, MLM
Hematoma to chin but no bony tenderness along mandible, able to open and close mouth without difficulty. 1CM laceration to left inner lower lip, not actively bleeding, no repair required.

## 2019-08-21 NOTE — ED ADULT NURSE NOTE - NSIMPLEMENTINTERV_GEN_ALL_ED
Implemented All Fall with Harm Risk Interventions:  [place] to call system. Call bell, personal items and telephone within reach. Instruct patient to call for assistance. Room bathroom lighting operational. Non-slip footwear when patient is off stretcher. Physically safe environment: no spills, clutter or unnecessary equipment. Stretcher in lowest position, wheels locked, appropriate side rails in place. Provide visual cue, wrist band, yellow gown, etc. Monitor gait and stability. Monitor for mental status changes and reorient to person, place, and time. Review medications for side effects contributing to fall risk. Reinforce activity limits and safety measures with patient and family. Provide visual clues: red socks.

## 2019-08-21 NOTE — ED ADULT NURSE REASSESSMENT NOTE - NS ED NURSE REASSESS COMMENT FT1
Patient states feeling better, no new syncope, chest pain or SOB, paced rhythm, BP improved, other vitals stable.  Evaluated by admitting PA at bedside.  Transported to [facility] in stable condiiton.

## 2019-08-21 NOTE — ED PROVIDER NOTE - OBJECTIVE STATEMENT
85 y/o F with PMHx of AFib on coumadin, HTN, HLD, and a pacemaker ([employer] made) presents to ED with syncopal episode today after discharge yesterday for syncopal workup including a normal echocardiogram and pacemaker interogation. Today, her home aide nurse came and patient was without symptoms, but again had syncopal episode without memory of it happening. Complains of mild chin bruising but no other pain. Denies chest pain, SOB, extremities, or severe headache.

## 2019-08-21 NOTE — ED ADULT NURSE NOTE - CHPI ED NUR SYMPTOMS NEG
no vomiting/no tingling/no weakness/no chills/no decreased eating/drinking/no dizziness/no fever/no nausea

## 2019-08-21 NOTE — ED ADULT NURSE NOTE - OBJECTIVE STATEMENT
Patient w/ Hx. of Afib, Pacemaker , on Coumadin, had syncope episode, states suddenly passed out and fell, unable to recall how she fell or for how long, states when came to EMS w/ patient.  Denies chest pain, SOB or lightheadedness or dizziness before syncope episode, states just suddenly happened.  Arrives a/oX 3, w/ hematoma to chin area, denies any lightheadedness, chest pain or SOB.  Paced rhythm on EKG .

## 2019-08-21 NOTE — H&P ADULT - NSHPLABSRESULTS_GEN_ALL_CORE
12.0   8.56  )-----------( 200      ( 21 Aug 2019 16:46 )             37.0       08-21    139  |  102  |  24<H>  ----------------------------<  102<H>  3.9   |  26  |  1.06    Ca    10.3      21 Aug 2019 16:46  Phos  3.1     08-20  Mg     2.1     08-20    TPro  6.2  /  Alb  3.7  /  TBili  1.8<H>  /  DBili  x   /  AST  23  /  ALT  14  /  AlkPhos  78  08-21      PT/INR - ( 21 Aug 2019 16:46 )   PT: 33.6 sec;   INR: 2.88          PTT - ( 21 Aug 2019 16:46 )  PTT:40.3 sec    CARDIAC MARKERS ( 21 Aug 2019 16:46 )  x     / <0.01 ng/mL / x     / x     / x                EKG:

## 2019-08-21 NOTE — H&P ADULT - PROBLEM SELECTOR PLAN 1
Recent admission 8/18-8/20 for syncope workup  -trop negative,  (no signs of fluid overload)  -no deficits on neuro exam   -EP interrogated pacemaker in ED and functioning normally  -Echo 8/20: Normal LV and RV size and function, severely dilated LA, mild-modTR, mild pulm HTN with PASP 48.6 mmHg, small pericardial effusion without evidence of cardiac tamponade  -continue to monitor on tele  -f/u orthostatics, if positive hydration   -discuss possible neuro consult with Dr. Hawkins in AM

## 2019-08-21 NOTE — ED ADULT TRIAGE NOTE - OTHER COMPLAINTS
pt arrives to ed with swelling/pain to L jaw and bruising to chin s/p syncopal episode while at home. denies rodriguez. takes coumadin daily.

## 2019-08-21 NOTE — PROGRESS NOTE ADULT - SUBJECTIVE AND OBJECTIVE BOX
EPS Device interrogation    Indication: syncope    Device model: 	SJM [place] 		Implanting Physician:     Functioning Mode: VVIR			    Underlying Rhythm: VS RVp 55%    Pacemaker dependency:  No    Battery status: 2.59V (MARIO 2.5 V ) 0.25 year  Interrogating parameters:   				RA			RV			LV    Sense:                                                                             6.9    mV    Threshold:                                                              0.875 V@ 0.4 ms    Pacing Impedance:                                                              375   om    Shock Impedance:                                                             n/a    Events/Alert:  none    Parameter change: 	none    Normal function PPM    [ ]EPS attending: Interrogation reviewed. Agree with above.

## 2019-08-21 NOTE — H&P ADULT - PROBLEM SELECTOR PLAN 2
INR 2.88 today, therapeutic   -currently NSR   -continue coumadin 2.5 mg Tues/Thur, 1.25 mg M/W/F  -f/u AM INR INR 2.88 today, therapeutic   -currently NSR   -continue coumadin 2.5 mg Tues/Thur, 1.25 mg M/W/F/Sat/Sun  -f/u AM INR

## 2019-08-21 NOTE — ED ADULT NURSE REASSESSMENT NOTE - NS ED NURSE REASSESS COMMENT FT1
Patient had another syncope episode lasting a few seconds  at this time while attempting to sit up and was about to go to bathroom, did not fall off bed or hit head, was assisted and repositioned back in bed, no chest pain or SOB.  Patient paced rhythm on cardiac monitor, vital signs stable, will continue to monitor.  For admit .  Room assignment pending. Patient had another syncope episode lasting a few seconds  at this time while attempting to sit up and was about to go to bathroom, did not fall off bed or hit head, was assisted and repositioned back in bed, no chest pain or SOB.  Dr. Snyder made aware, no new orders, sign out done for admit.   Patient paced rhythm on cardiac monitor, vital signs stable, will continue to monitor.  For admit .  Room assignment pending.

## 2019-08-21 NOTE — ED ADULT NURSE REASSESSMENT NOTE - NS ED NURSE REASSESS COMMENT FT1
Patient a/oX 3, no new syncope, chest pain , SOB or any other symptom complaint, Paced rhythm on cardiac monitor, vital signs stable.  Right hand PIV #20 in place, all labs sent, no complications.  CT scan, XraY DONE, results and disposition pending.

## 2019-08-21 NOTE — H&P ADULT - HISTORY OF PRESENT ILLNESS
87 y/o F with PMHx of breast cancer s/p L mastectomy 2016, AFib on coumadin, HTN, HLD, and a pacemaker ([employer]) presents to ED with syncopal episode today after discharged yesterday 8/20/19 for syncopal workup including a normal echocardiogram and normal pacemaker interrogation. Per home health aid, witnessed syncopal episode today without any prodrome or memory of it happening. Complains of mild chin bruising but no other pain. Denies chest pain, SOB, extremities, or severe headache.   In ED /72, HR 72, RR 16, O2 98% RA, T 97.6, labs significant for trop negative x1.  CT head and CT maxillofacial without acute fracture or bleed. Pacemaker interrogated in ED by EP and functioning normally. Hydralazine 5 mg IV x1 given in ED for 's.  EKG   Pt now admitted to [address] [place] [address] for syncope workup. 87 y/o F with PMHx of breast cancer s/p L mastectomy 2016, AFib on coumadin, HTN, HLD, and a pacemaker ([employer]) presents to ED with syncopal episode today after discharged yesterday 8/20/19 for syncopal workup including a normal echocardiogram and normal pacemaker interrogation. Per visiting nurse,  witnessed syncopal episode today without any prodrome when pt was walking to kitchen, collapsed to ground and hitting her chin and knees.  Pt without memory of episode and regained consciousness when EMS responded to her house. Pt endorsing mild TTP to chin.  Denies chest pain, SOB, orthopnea, PND, LE edema, palpitations, N/V/D, melena, hematuria, or headache.   In ED /72, HR 72, RR 16, O2 98% RA, T 97.6, labs significant for trop negative x1.  CT head and CT maxillofacial without acute fracture or bleed. Pacemaker interrogated in ED by EP and functioning normally. Hydralazine 5 mg IV x1 given in ED for 's with SBP currently 110's. Pt with another syncopal episode in ED witnessed by son occurring when pt moved from laying to sitting position while attempting to use the restroom lasting for a few seconds. Pt endorses her vision becoming black and feeling unwell prior to the episode. Pt remained in the bed during the event, VSS during the event.   Pt now admitted to [facility] for syncope workup. 85 y/o F with PMHx of breast cancer s/p L mastectomy 2016, AFib on coumadin (s/p [employer] pacemaker), HTN, HLD presents to ED with syncopal episode today after discharged yesterday 8/20/19 for syncopal workup including a normal echocardiogram and normal pacemaker interrogation. Per visiting nurse,  witnessed syncopal episode today without any prodrome when pt was walking to kitchen, collapsed to ground and hitting her chin and knees.  Pt without memory of episode and regained consciousness when EMS responded to her house. Pt endorsing mild TTP to chin.  Denies chest pain, SOB, orthopnea, PND, LE edema, palpitations, N/V/D, melena, hematuria, or headache.   In ED /72, HR 72, RR 16, O2 98% RA, T 97.6, labs significant for trop negative x1.  CT head and CT maxillofacial without acute fracture or bleed. EKG NSR @ 79 bpm with 1st degree AV block, PVC TWI V5-V6 and  (unchanged from prior EKG). Pacemaker interrogated in ED by EP and functioning normally. Hydralazine 5 mg IV x1 given in ED for 's with SBP currently 110's. Pt with another syncopal episode in ED witnessed by son occurring when pt moved from laying to sitting position while attempting to use the restroom lasting for a few seconds. Pt endorses her vision becoming black and feeling unwell prior to the episode. Pt remained in the bed during the event, VSS during the event.   Pt now admitted to [facility] for syncope workup.

## 2019-08-21 NOTE — H&P ADULT - ASSESSMENT
87 y/o F with PMHx of breast cancer s/p L mastectomy 2016, AFib on coumadin  (s/p [employer] pacemaker), HTN, HLD presents to ED with syncopal episode today after discharged yesterday 8/20/19 for syncopal workup including a normal echocardiogram and normal pacemaker interrogation.  CT head and CT maxillofacial without acute fracture or bleed. Pacemaker interrogated in ED by EP and functioning normally. Pt now admitted to [address] [place] [address] for syncope workup.

## 2019-08-21 NOTE — ED PROVIDER NOTE - CLINICAL SUMMARY MEDICAL DECISION MAKING FREE TEXT BOX
87 y/o F with PMHx of AFib on coumadin, HTN, HLD, and a pacemaker ([employer] made) presents to ED with sudden syncopal episode today after discharge yesterday for syncopal workup including a normal echocardiogram and pacemaker interogation. Plan for labs, CT head and face, and pacemaker interrogation in ED. Discuss with Dr. Hawkins. May need pacemaker replacement due to extended pauses. 87 y/o F with PMHx of AFib on coumadin, HTN, HLD, and a pacemaker ([employer] made) presents to ED with sudden syncopal episode today after discharge yesterday for syncopal workup including a normal echocardiogram and pacemaker interogation. Plan for labs, CT head and face, and pacemaker interrogation in ED. Discuss with Dr. Hawkins. Labs and interrogation in ED ok.  CT head and face no acute injury.  Discussed with Ross who recommends admission.

## 2019-08-21 NOTE — H&P ADULT - NSHPSOCIALHISTORY_GEN_ALL_CORE
EtOH social  Cigarettes 5 pack year smoking history, quit >10 years ago  Drugs denies  Lives alone EtOH social  Cigarettes 5 pack year smoking history, quit >10 years ago  denies drug use   Lives alone and ambulates with cane

## 2019-08-22 NOTE — PROGRESS NOTE ADULT - PROBLEM SELECTOR PLAN 1
- f/u neurology recs  - f/u EP recs  - discontinue diazide  - c/w mIVF with normal saline at 75cc/hr   - continue to monitor on tele

## 2019-08-22 NOTE — PHYSICAL THERAPY INITIAL EVALUATION ADULT - ADDITIONAL COMMENTS
Patient from elevator apartment, no steps to enter. Patient with numerous falls over past year (8 falls, 3 non-mechanical falls in past 2 days).  Patient receives home PT and was preparing to receive home health assistance prior to episode of syncope at home.

## 2019-08-22 NOTE — PROGRESS NOTE ADULT - SUBJECTIVE AND OBJECTIVE BOX
SANDRA BEHRLE 86y Female    Interval HPI:  Patient reports some jaw pain after fall. Denies any episodes of confusion, palpitations, CP, SOB. Reports lightheadedness on standing.     Patient seen and examined at bedside:    T(C): 36.7 (08-22-19 @ 08:29), Max: 36.9 (08-22-19 @ 01:00)  HR: 64 (08-22-19 @ 06:20) (64 - 88)  BP: 141/67 (08-22-19 @ 06:20) (119/55 - 218/103)  RR: 18 (08-22-19 @ 06:20) (16 - 18)  SpO2: 100% (08-22-19 @ 06:20) (95% - 100%)    Review of systems negative except as mentioned above    allopurinol 100 milliGRAM(s) Oral daily  atorvastatin 10 milliGRAM(s) Oral at bedtime  escitalopram 10 milliGRAM(s) Oral daily  folic acid 1 milliGRAM(s) Oral daily  letrozole 2.5 milliGRAM(s) Oral daily  levothyroxine 88 MICROGram(s) Oral daily  multivitamin 1 Tablet(s) Oral daily  nebivolol 20 milliGRAM(s) Oral daily  sodium chloride 0.9%. 1000 milliLiter(s) IV Continuous <Continuous>      Physical Exam:    GENERAL: Pleasant elderly  female, NAD  HEENT: NC, large ecchymoses/contusion on left aspect of  mandible, PERRL, EOMI   NECK: Supple, no lad, no jvd  RESPIRATORY: CTAB, equal and symmetric chest expansion, good effort and depth  CV: S1S2 appreciated, RRR, no m/r/g  GI: Soft, NT/ND, normal active BS  EXT: No cyanosis, edema  MSK: Frail, decreased bulk and tone    Labs:                        11.5   8.92  )-----------( 208      ( 22 Aug 2019 07:15 )             35.1     08-22    139  |  105  |  20  ----------------------------<  94  3.7   |  26  |  1.01    Ca    10.2      22 Aug 2019 07:15  Mg     1.8     08-22    TPro  6.2  /  Alb  3.7  /  TBili  1.8<H>  /  DBili  x   /  AST  23  /  ALT  14  /  AlkPhos  78  08-21    PT/INR - ( 22 Aug 2019 07:15 )   PT: 32.6 sec;   INR: 2.79          PTT - ( 22 Aug 2019 07:15 )  PTT:37.7 sec      CAPILLARY BLOOD GLUCOSE      POCT Blood Glucose.: 122 mg/dL (21 Aug 2019 15:30)            Imaging:

## 2019-08-22 NOTE — CONSULT NOTE ADULT - ASSESSMENT
85 y/o F with PMHx of breast cancer s/p L mastectomy 2016, AFib on coumadin (s/p [employer] pacemaker), HTN, HLD presents to ED with syncopal episode today for the second timem in the last week. syncopal workup including a echocardiogram and pacemaker interrogation and head CT have been normal and unremarkable so far. her exam is completely normal.   except for positive orthostatic hypotension.     Given the history and exam and the lab and imaging findings so far, very unlikely that the syncope is due to any neurologic deficit. Although regarding the hx of breast cancer there is a possibility of mets to her brain.   - Pt can follow up as outpatient for MRI of her brain to get evaluated for any space occupying lesion. 85 y/o F with PMHx of breast cancer s/p L mastectomy 2016, AFib on coumadin (s/p [employer] pacemaker), HTN, HLD presents to ED with syncopal episode today for the second timem in the last week. syncopal workup including a echocardiogram and pacemaker interrogation and head CT have been normal and unremarkable so far. her exam is completely normal.   Syncope 2/2 orthostatic hypotension  Very low suspicion for primary neurologic process      Can have outpt EEG following discharge  orthostatic hypotension management as per primary team    Neurology signing off.  Please reconsult for addtl questions

## 2019-08-22 NOTE — PROGRESS NOTE ADULT - SUBJECTIVE AND OBJECTIVE BOX
Interval Events:  Patient seen and examined at bedside.    Patient is a 86y old  Female who presents with a chief complaint of syncope (22 Aug 2019 17:46)  Seen around 8am today.  Events reviewed in detail with patient and housestaff.      PAST MEDICAL & SURGICAL HISTORY:  Pacemaker  HTN (hypertension)  Breast cancer  Hypothyroid  High cholesterol  Afib  S/P radioactive iodine thyroid ablation  S/P appendectomy  H/O left mastectomy      MEDICATIONS:  Pulmonary:    Antimicrobials:    Anticoagulants:  warfarin 1.25 milliGRAM(s) Oral once    Cardiac:  nebivolol 20 milliGRAM(s) Oral daily      Allergies    adhesives (Unknown)  latex (Rash; Urticaria)  No Known Drug Allergies    Intolerances        Vital Signs Last 24 Hrs  T(C): 36.8 (22 Aug 2019 19:01), Max: 36.9 (22 Aug 2019 01:00)  T(F): 98.2 (22 Aug 2019 19:01), Max: 98.5 (22 Aug 2019 01:00)  HR: 59 (22 Aug 2019 20:41) (59 - 72)  BP: 123/54 (22 Aug 2019 20:41) (108/54 - 157/68)  BP(mean): --  RR: 16 (22 Aug 2019 20:41) (16 - 19)  SpO2: 96% (22 Aug 2019 20:41) (96% - 100%)    08-22 @ 07:01  -  08-22 @ 21:27  --------------------------------------------------------  IN: 360 mL / OUT: 0 mL / NET: 360 mL          LABS:      CBC Full  -  ( 22 Aug 2019 12:53 )  WBC Count : 10.19 K/uL  RBC Count : 4.16 M/uL  Hemoglobin : 12.2 g/dL  Hematocrit : 37.5 %  Platelet Count - Automated : 239 K/uL  Mean Cell Volume : 90.1 fl  Mean Cell Hemoglobin : 29.3 pg  Mean Cell Hemoglobin Concentration : 32.5 gm/dL  Auto Neutrophil # : x  Auto Lymphocyte # : x  Auto Monocyte # : x  Auto Eosinophil # : x  Auto Basophil # : x  Auto Neutrophil % : x  Auto Lymphocyte % : x  Auto Monocyte % : x  Auto Eosinophil % : x  Auto Basophil % : x    08-22    139  |  105  |  20  ----------------------------<  94  3.7   |  26  |  1.01    Ca    10.2      22 Aug 2019 07:15  Mg     1.8     08-22    TPro  6.2  /  Alb  3.7  /  TBili  1.8<H>  /  DBili  x   /  AST  23  /  ALT  14  /  AlkPhos  78  08-21    PT/INR - ( 22 Aug 2019 07:15 )   PT: 32.6 sec;   INR: 2.79          PTT - ( 22 Aug 2019 07:15 )  PTT:37.7 sec                            Assessment and Plan:  Patient seen around 8am today with housestaff.  Recurrent syncope most likely due to orthostasis.  EP and Neurology input appreciated.   In general, the patient does not seem to be taking enough po at home.  I think she needs help at home and I spoke with her about that today.  We stopped her Dyazide today.  PT has recommended home with home PT, although I think discharge to [facility] would be a safer transition for her.    She has had multiple falls over the last year including falling out of bed a few times-  her previous falls were determined to be mechanical.  She is on Warfarin for atrial fibrillation.

## 2019-08-22 NOTE — CONSULT NOTE ADULT - SUBJECTIVE AND OBJECTIVE BOX
HPI:  87 y/o F with history of breast cancer s/p L mastectomy , AFib on coumadin (s/p [employer] pacemaker), HTN, HLD presents to ED with syncope, Device check with normal function and no arrhythmias noted.  EP called for syncope.    She was discharged from the hospital  after a syncopal episode with a normal  echocardiogram and normal pacemaker interrogation. She was readmitted yesterday after her home nurse,  witnessed syncopal episode today without any prodrome when pt was walking to kitchen, collapsed to ground and hitting her chin and knees.  Pt without memory of episode and regained consciousness when EMS responded to her house. She notes lightheadedness with change in position.  She was seen in the ER with unchanged EKG, Pacemaker interrogation reveals normal function and no arrhythmias.  + orthostatic hypotension with laying 131/60, sittin/54, standin/44.  Tele unremarkable.      PAST MEDICAL & SURGICAL HISTORY:  Pacemaker  HTN (hypertension)  Breast cancer  Hypothyroid  High cholesterol  Afib  S/P radioactive iodine thyroid ablation  S/P appendectomy  H/O left mastectomy      No pertinent family history in first degree relatives      Social History:no smoking, no drugs      home medications:  · 	folic acid 1 mg oral tablet: 1 tab(s) orally once a day  · 	Multiple Vitamins oral tablet: 1 tab(s) orally once a day  · 	levothyroxine 88 mcg (0.088 mg) oral tablet: 1 tab(s) orally once a day  · 	nebivolol 20 mg oral tablet: 1 tab(s) orally   · 	letrozole 2.5 mg oral tablet: 1 tab(s) orally once a day  · 	allopurinol 100 mg oral tablet: 1 tab(s) orally once a day  · 	triamterene-hydrochlorothiazide 37.5 mg-25 mg oral tablet: 1 tab(s) orally once a day  · 	atorvastatin 10 mg oral tablet: 1 tab(s) orally once a day (at bedtime)  · 	escitalopram 10 mg oral tablet: 1 tab(s) orally once a day  · 	CoQ10: 200 milligram(s) orally once a day  · 	warfarin 2.5 mg oral tablet: 2.5 mg for Tues/Thursday  	1.25 mg for Monday/Wednesday/Friday  Inpatient Medications:   allopurinol 100 milliGRAM(s) Oral daily  atorvastatin 10 milliGRAM(s) Oral at bedtime  escitalopram 10 milliGRAM(s) Oral daily  folic acid 1 milliGRAM(s) Oral daily  letrozole 2.5 milliGRAM(s) Oral daily  levothyroxine 88 MICROGram(s) Oral daily  multivitamin 1 Tablet(s) Oral daily  nebivolol 20 milliGRAM(s) Oral daily  sodium chloride 0.9%. 1000 milliLiter(s) IV Continuous <Continuous>      Allergies: adhesives (Unknown)  latex (Rash; Urticaria)  No Known Drug Allergies      ROS:   CONSTITUTIONAL: No fever, weight loss   EYES: Pt denies  RESPIRATORY: No cough, wheezing, chills or hemoptysis; No Shortness of Breath  CARDIOVASCULAR: No chest pain, palpitations  GASTROINTESTINAL: Pt denies  NEUROLOGICAL: Pt denies  SKIN: Pt denies   PSYCHIATRIC: Pt denies  HEME/LYMPH: Pt denies    PHYSICAL:  T(C): 36.7 (19 @ 08:29), Max: 36.9 (19 @ 01:00)  HR: 64 (19 @ 06:20) (64 - 88)  BP: 141/67 (19 @ 06:20) (119/55 - 218/103)  RR: 18 (19 @ 06:20) (16 - 18)  SpO2: 100% (19 @ 06:20) (95% - 100%)     Appearance: No acute distress, well developed + bruising by chin  Eyes: normal appearing conjunctiva, pupils and eyelids  Cardiovascular: Normal S1 S2, No JVD   Respiratory: Lungs clear to auscultation   Gastrointestinal:  Soft, NT/ND 	  Neurologic:  No deficit noted  Psych: A&Ox3, normal mood/affect  Musculoskeletal: normal gait  Skin: no rash noted, normal color and pigmentation.        LABS:                        11.5   8.92  )-----------( 208      ( 22 Aug 2019 07:15 )             35.1     139  |  105  |  20  ----------------------------<  94  3.7   |  26  |  1.01    Ca    10.2       Mg     1.8     TPro  6.2  /  Alb  3.7  /  TBili  1.8<H>  /  DBili  x   /  AST  23  /  ALT  14  /  AlkPhos  78   PT: 32.6 sec;   INR: 2.79   PTT:37.7 sec  TSH 0.4    EKG: paced    Telemetry PVC no  other arrhythmias noted    ECHO:   < from: Echocardiogram (19 @ 11:46) >   1. Normal left and right ventricular size and systolic function.   2. Severely dilated left atrium.   3. Mild-to-moderate tricuspid regurgitation.   4. Mild pulmonary hypertension, PASP is 48.6 mmHg.   5. Small pericardial effusion without evidence of cardiac tamponade.         Assessment Plan:  87 y/o F with history of breast cancer s/p L mastectomy , AFib on coumadin (s/p [employer] pacemaker), HTN, HLD presents to ED with syncope, Device check with normal function and no arrhythmias noted.  EP called for syncope.  Patient has orthostatic hypotension likely resulting in syncope.  She has volatile BP (from 110-200 as per readings) and goes as low as 80 with changes in position.  Pacemaker functions normally and there is not a heart rhythm etiology for her syncope.  She was educated on treatment for orthostatic hypotension such as slowly changing positions, avoiding large meals and instead eating small meals regularly throughout the day, incorporating electrolyte rich beverage (ensure, vitamin water, Gatorade zero), avoiding warm crowded areas and if she becomes lightheaded to go to a safe position.
NEUROLOGY INITIAL CONSULT NOTE    CHIEF COMPLAINT:      HPI:  85 y/o F with PMHx of breast cancer s/p L mastectomy 2016, AFib on coumadin (s/p [employer] pacemaker), HTN, HLD presents to ED with syncopal episode today after discharged yesterday 8/20/19 for syncopal workup including a normal echocardiogram and normal pacemaker interrogation. Per visiting nurse,  witnessed syncopal episode today without any prodrome when pt was walking to kitchen, collapsed to ground and hitting her chin and knees.  Pt without memory of episode and regained consciousness when EMS responded to her house. Pt endorsing mild TTP to chin.  Denies chest pain, SOB, orthopnea, PND, LE edema, palpitations, N/V/D, melena, hematuria, or headache.   In ED /72, HR 72, RR 16, O2 98% RA, T 97.6, labs significant for trop negative x1.  CT head and CT maxillofacial without acute fracture or bleed. EKG NSR @ 79 bpm with 1st degree AV block, PVC TWI V5-V6 and  (unchanged from prior EKG). Pacemaker interrogated in ED by EP and functioning normally. Hydralazine 5 mg IV x1 given in ED for 's with SBP currently 110's. Pt with another syncopal episode in ED witnessed by son occurring when pt moved from laying to sitting position while attempting to use the restroom lasting for a few seconds. Pt endorses her vision becoming black and feeling unwell prior to the episode. Pt remained in the bed during the event, VSS during the event.   Pt now admitted to [facility] for syncope workup. (21 Aug 2019 18:26)    Interval history: Pt is seen and examined today. Stated that the has had 3 times of fall in the last week that has lost her consciousness in 2 of them. the first one was after she came out of restaurant and walked for 20 steps and then felt dizzy and does not remember what happened after that. denies any dizziness, palpitation, feeling warm or cold, sweating, nausea or vomiting or chest pain when she gained her consciousness. Her son was with her and he denies any shaking or losing her urine or bladder control or tongue biting about her. then she was transferred to [facility] for syncope. All her work up including head CT, echo and labs were normal and she was discharged. the next time she was at home and started walking to her kitchen that fell. her care giver witnessed the fall and reported hitting her chin. she again denies any prodromal symptoms. she again was brought to hospital. the duration between 2 hospitalization was 2 days. In this admission again all the work up has been negative. As per son and the patient the 3rd episode happened today. She did not lose her consciousness this time and did not fall. this time she stood up from toilet and despite taking her time standing up and taking steps, she felt fainting again. denies any prodromal symptoms.   her orthostatic hypotension has been positive with drop from 130 to 90 from lying to sitting and drop from 90 to 80 from sitting to standing. her head CT has been negative for mets or mass.        PAST MEDICAL & SURGICAL HISTORY:  Pacemaker  HTN (hypertension)  Breast cancer  Hypothyroid  High cholesterol  Afib  S/P radioactive iodine thyroid ablation  S/P appendectomy  H/O left mastectomy      REVIEW OF SYSTEMS:  As per HPI, otherwise negative for Constitutional, Eyes, Ears/Nose/Mouth/Throat, Neck, Cardiovascular, Respiratory, Gastrointestinal, Genitourinary, Skin, Endocrine, Musculoskeletal, Psychiatric, and Hematologic/Lymphatic.    MEDICATIONS  (STANDING):  allopurinol 100 milliGRAM(s) Oral daily  atorvastatin 10 milliGRAM(s) Oral at bedtime  escitalopram 10 milliGRAM(s) Oral daily  folic acid 1 milliGRAM(s) Oral daily  letrozole 2.5 milliGRAM(s) Oral daily  levothyroxine 88 MICROGram(s) Oral daily  multivitamin 1 Tablet(s) Oral daily  nebivolol 20 milliGRAM(s) Oral daily  sodium chloride 0.9%. 1000 milliLiter(s) (75 mL/Hr) IV Continuous <Continuous>  warfarin 1.25 milliGRAM(s) Oral once    MEDICATIONS  (PRN):      Allergies    adhesives (Unknown)  latex (Rash; Urticaria)  No Known Drug Allergies    Intolerances        FAMILY HISTORY:  No pertinent family history in first degree relatives      SOCIAL HISTORY:  Living Situation:  Occupation:  Tobacco:  Alcohol:   Drug use:      VITAL SIGNS:  Vital Signs Last 24 Hrs  T(C): 36.7 (22 Aug 2019 13:45), Max: 36.9 (22 Aug 2019 01:00)  T(F): 98.1 (22 Aug 2019 13:45), Max: 98.5 (22 Aug 2019 01:00)  HR: 60 (22 Aug 2019 17:00) (60 - 85)  BP: 108/54 (22 Aug 2019 17:00) (108/54 - 218/103)  BP(mean): --  RR: 17 (22 Aug 2019 17:00) (17 - 19)  SpO2: 100% (22 Aug 2019 17:00) (95% - 100%)    PHYSICAL EXAMINATION:  Constitutional: WDWN; NAD  Eyes: Conjunctiva and sclera clear.  Cardiovascular: Regular rate and rhythm; S1 and S2 Normal; No murmurs, gallops or rubs.  Neurologic:  - Mental Status:  AAOx3; speech is fluent with intact naming, repetition, and comprehension; Good overall fund of knowledge.  - Cranial Nerves II-XII:    II:   Visual fields are full to confrontation; Fundoscopic exam is normal with sharp discs; Pupils are equal, round, and reactive to light.  III, IV, VI:  Extraocular movements are intact without nystagmus.  V:  Facial sensation is intact in the V1-V3 distribution bilaterally.  VII:  Face is symmetric with normal eye closure and smile  VIII:  Hearing is intact to finger rub.  IX, X:  Uvula is midline and soft palate rises symmetrically  XI:  Head turning and shoulder shrug are intact.  XII:  Tongue protrudes in the midline.  - Motor:  Strength is 5/5 throughout.  There is no pronator drift.  Normal muscle bulk and tone throughout.  - Reflexes:  2+ and symmetric at the biceps, triceps, brachioradialis, knees, and ankles.  Plantar responses flexor.  - Sensory:  Intact to light touch, pin prick, vibration, and joint-position sense throughout.  - Coordination:  Finger-nose-finger and heel-knee-shin intact without dysmetria.    - Gait:   deferred     LABS:                        12.2   10.19 )-----------( 239      ( 22 Aug 2019 12:53 )             37.5     08-22    139  |  105  |  20  ----------------------------<  94  3.7   |  26  |  1.01    Ca    10.2      22 Aug 2019 07:15  Mg     1.8     08-22    TPro  6.2  /  Alb  3.7  /  TBili  1.8<H>  /  DBili  x   /  AST  23  /  ALT  14  /  AlkPhos  78  08-21    PT/INR - ( 22 Aug 2019 07:15 )   PT: 32.6 sec;   INR: 2.79          PTT - ( 22 Aug 2019 07:15 )  PTT:37.7 sec      RADIOLOGY & ADDITIONAL STUDIES:      IMPRESSION & RECOMMENDATIONS:

## 2019-08-23 NOTE — DISCHARGE NOTE NURSING/CASE MANAGEMENT/SOCIAL WORK - NSDCDPATPORTLINK_GEN_ALL_CORE
You can access the [facility] Portal, offered by [facility], by registering with the following website: [email or web address]

## 2019-08-23 NOTE — DISCHARGE NOTE PROVIDER - CARE PROVIDER_API CALL
Liliana Hawkins)  Cardiovascular Medicine  [address], [address]  [place] [address]  Phone: [phone]  Fax: [phone]  Follow Up Time:

## 2019-08-23 NOTE — PROGRESS NOTE ADULT - ASSESSMENT
87 y/o F with PMHx of breast cancer s/p L mastectomy 2016, AFib on coumadin  (s/p [employer] pacemaker), HTN, HLD presents to ED with syncopal episode today after discharged yesterday 8/20/19 for syncopal workup including a normal echocardiogram and normal pacemaker interrogation.  CT head and CT maxillofacial without acute fracture or bleed. Pacemaker interrogated in ED by EP and functioning normally. Pt now admitted to [address] [place] [address] for syncope workup.
85 y/o F with PMHx of breast cancer s/p L mastectomy 2016, AFib on coumadin  (s/p [employer] pacemaker), HTN, HLD presents to ED with syncopal episode today after discharged yesterday 8/20/19 for syncopal workup including a normal echocardiogram and normal pacemaker interrogation.  CT head and CT maxillofacial without acute fracture or bleed. Pacemaker interrogated in ED by EP and functioning normally. Pt now admitted to [facility] for syncope workup with positive orthostatics.
87 y/o F with PMHx of breast cancer s/p L mastectomy 2016, AFib on coumadin (s/p [employer] pacemaker), HTN, HLD presents to ED with syncopal episode today for the second timem in the last week. syncopal workup including a echocardiogram and pacemaker interrogation and head CT have been normal and unremarkable so far. her exam is completely normal.   Syncope 2/2 orthostatic hypotension  Very low suspicion for any neurologic process      Can have outpt EEG following discharge  orthostatic hypotension management as per primary team    Neurology signing off.  Please reconsult for addtl questions

## 2019-08-23 NOTE — PROGRESS NOTE ADULT - SUBJECTIVE AND OBJECTIVE BOX
Interval Events:  Patient seen and examined at bedside.    Patient is a 86y old  Female who presents with a chief complaint of syncope (23 Aug 2019 17:49)  Seen around 5:30pm.  OOB in chair.  Appears comfortable.  Daughter in law  at side.      PAST MEDICAL & SURGICAL HISTORY:  Pacemaker  HTN (hypertension)  Breast cancer  Hypothyroid  High cholesterol  Afib  S/P radioactive iodine thyroid ablation  S/P appendectomy  H/O left mastectomy      MEDICATIONS:  Pulmonary:    Antimicrobials:    Anticoagulants:    Cardiac:      Allergies    adhesives (Unknown)  latex (Rash; Urticaria)  No Known Drug Allergies    Intolerances        Vital Signs Last 24 Hrs  T(C): 36.7 (23 Aug 2019 18:33), Max: 36.8 (23 Aug 2019 14:11)  T(F): 98 (23 Aug 2019 18:33), Max: 98.2 (23 Aug 2019 14:11)  HR: 68 (23 Aug 2019 19:13) (59 - 72)  BP: 155/69 (23 Aug 2019 19:13) (123/54 - 168/13)  BP(mean): --  RR: 19 (23 Aug 2019 19:13) (16 - 19)  SpO2: 93% (23 Aug 2019 06:05) (93% - 96%)    08-22 @ 07:01 - 08-23 @ 07:00  --------------------------------------------------------  IN: 540 mL / OUT: 0 mL / NET: 540 mL    08-23 @ 07:01 - 08-23 @ 20:17  --------------------------------------------------------  IN: 300 mL / OUT: 200 mL / NET: 100 mL          LABS:      CBC Full  -  ( 22 Aug 2019 12:53 )  WBC Count : 10.19 K/uL  RBC Count : 4.16 M/uL  Hemoglobin : 12.2 g/dL  Hematocrit : 37.5 %  Platelet Count - Automated : 239 K/uL  Mean Cell Volume : 90.1 fl  Mean Cell Hemoglobin : 29.3 pg  Mean Cell Hemoglobin Concentration : 32.5 gm/dL  Auto Neutrophil # : x  Auto Lymphocyte # : x  Auto Monocyte # : x  Auto Eosinophil # : x  Auto Basophil # : x  Auto Neutrophil % : x  Auto Lymphocyte % : x  Auto Monocyte % : x  Auto Eosinophil % : x  Auto Basophil % : x    08-23    138  |  106  |  22  ----------------------------<  84  4.0   |  25  |  1.07    Ca    9.7      23 Aug 2019 06:24  Mg     2.2     08-23      PT/INR - ( 23 Aug 2019 06:24 )   PT: 34.5 sec;   INR: 2.95          PTT - ( 23 Aug 2019 06:24 )  PTT:37.6 sec                    :        Assessment and Plan:  I saw the pt around 5:30 pm tonight.  I had spoke with malissa throughout the day and I had called her son Nikita in the am to discuss patient's needs at home.   Although reportedly not orthostatic today, I took her blood pressure with the manual cuff tonight and SBP was 100mmHg sitting in the right arm and SBP 82mmHg standing.  I recommended cutting the Bystolic dose and to resume gentle fluids overnight.  The pt has agreed to hiring an aide to go home with her for now.  It's not clear if she refused AVIVA or if PT didn't think it was needed.  I still think this would be the best and safest situation for the pt and I expressed this to her family.  She doesn't like to use a/c apparently in the living room and her windows don't open easily  so the apt is hot.

## 2019-08-23 NOTE — DISCHARGE NOTE PROVIDER - NSDCCPCAREPLAN_GEN_ALL_CORE_FT
PRINCIPAL DISCHARGE DIAGNOSIS  Diagnosis: Syncope  Assessment and Plan of Treatment: You had an episode in which you became temporarily unconscious, likely due to problems with your body regulating your blood pressure. This is called orthostatic hypotension; when your body's blood pressure drops when you shift quickly from laying down or sitting to standing up. Be sure to stay adequately hydrated and transition from different positions slowly so your body has time to adjust.      SECONDARY DISCHARGE DIAGNOSES  Diagnosis: Afib  Assessment and Plan of Treatment: Please continue to take your coumadin and nebivolol as prescribed.    Diagnosis: Orthostatic hypotension  Assessment and Plan of Treatment: Your diuretic medication was stopped. Please follow up with your doctor who will continue to monitor your condition/symptoms and transition slowly from seated to standing positions. Please stay hydrated.    Diagnosis: Hyperlipidemia  Assessment and Plan of Treatment: Continue to take your atorvastatin daily PRINCIPAL DISCHARGE DIAGNOSIS  Diagnosis: Syncope  Assessment and Plan of Treatment: You had an episode in which you became temporarily unconscious, likely due to problems with your body regulating your blood pressure. This is called orthostatic hypotension; when your body's blood pressure drops when you shift quickly from laying down or sitting to standing up. Be sure to stay adequately hydrated and transition from different positions slowly so your body has time to adjust. Please wait 60 seconds before standing up from a sitting position. STOP TAKING YOUR TRIAMTERENE/HCTZ diuretic as this can make you dehydrated. Make sure to maintain hydrated and stay in a cool room. Follow up with Dr. Hawkins on Wednesday 8/28/19, she will be calling you with the time of your appointment.      SECONDARY DISCHARGE DIAGNOSES  Diagnosis: Afib  Assessment and Plan of Treatment: Please continue to take your coumadin and nebivolol as prescribed.    Diagnosis: HTN (hypertension)  Assessment and Plan of Treatment: We decreased your blood pressure medication, please STOP BYSTOLIC 20MG DAILY AND START BYSTOLIC 10MG DAILY.    Diagnosis: Hyperlipidemia  Assessment and Plan of Treatment: Continue to take your atorvastatin daily

## 2019-08-23 NOTE — PROGRESS NOTE ADULT - PROBLEM SELECTOR PLAN 2
- currently NSR   - continue coumadin 2.5 mg Tues/Thur, 1.25 mg M/W/F/Sat/Sun  - f/u daily INR, INR 2.79 today, therapeutic  - c/w rate control with bystolic 20mg qd
- currently NSR   - continue coumadin 2.5 mg Tues/Thur, 1.25 mg M/W/F/Sat/Sun  - f/u daily INR, INR 2.9 today, therapeutic  - rate control with bystolic 10mg qd starting tomorrow, received 20mg today

## 2019-08-23 NOTE — DISCHARGE NOTE PROVIDER - HOSPITAL COURSE
85 y/o F with PMHx of breast cancer s/p L mastectomy 2016, AFib on coumadin (s/p [employer] pacemaker), HTN, HLD presents to ED with syncopal episode today after discharged yesterday 8/20/19 for syncopal workup including a normal echocardiogram and normal pacemaker interrogation. Per visiting nurse,  witnessed syncopal episode today without any prodrome when pt was walking to kitchen, collapsed to ground and hitting her chin and knees.  Pt without memory of episode and regained consciousness when EMS responded to her house. Pt endorsing mild TTP to chin.  Denies chest pain, SOB, orthopnea, PND, LE edema, palpitations, N/V/D, melena, hematuria, or headache.     In ED /72, HR 72, RR 16, O2 98% RA, T 97.6, labs significant for trop negative x1.  CT head and CT maxillofacial without acute fracture or bleed. EKG NSR @ 79 bpm with 1st degree AV block, PVC TWI V5-V6 and  (unchanged from prior EKG). Pacemaker interrogated in ED by EP and functioning normally. Hydralazine 5 mg IV x1 given in ED for 's with SBP currently 110's. Pt with another syncopal episode in ED witnessed by son occurring when pt moved from laying to sitting position while attempting to use the restroom lasting for a few seconds. Pt endorses her vision becoming black and feeling unwell prior to the episode. Pt remained in the bed during the event, VSS during the event.     Pt now admitted to 5 [facility] for syncope workup. 87 y/o F with PMHx of breast cancer s/p L mastectomy 2016, AFib on coumadin (s/p [employer] pacemaker), HTN, HLD presents to ED with syncopal episode today after discharged yesterday 8/20/19 for syncopal workup including a normal echocardiogram and normal pacemaker interrogation. Per visiting nurse,  witnessed syncopal episode today without any prodrome when pt was walking to kitchen, collapsed to ground and hitting her chin and knees.  Pt without memory of episode and regained consciousness when EMS responded to her house. Pt endorsing mild TTP to chin.  Denies chest pain, SOB, orthopnea, PND, LE edema, palpitations, N/V/D, melena, hematuria, or headache. In ED /72, HR 72, RR 16, O2 98% RA, T 97.6, labs significant for trop negative x1.  CT head and CT maxillofacial without acute fracture or bleed. EKG NSR @ 79 bpm with 1st degree AV block, PVC TWI V5-V6 and  (unchanged from prior EKG). Pacemaker interrogated in ED by EP and functioning normally. Hydralazine 5 mg IV x1 given in ED for 's with SBP currently 110's. Pt with another syncopal episode in ED witnessed by son occurring when pt moved from laying to sitting position while attempting to use the restroom lasting for a few seconds. Pt endorses her vision becoming black and feeling unwell prior to the episode. Pt remained in the bed during the event, VSS during the event. Pt now admitted to 5 [facility] for syncope workup. 85 y/o F with PMHx of breast cancer s/p L mastectomy 2016, AFib on coumadin (s/p [employer] pacemaker), HTN, HLD presents to ED with syncopal episode today after discharged yesterday 8/20/19 for syncopal workup including a normal echocardiogram and normal pacemaker interrogation. Per visiting nurse,  witnessed syncopal episode today without any prodrome when pt was walking to kitchen, collapsed to ground and hitting her chin and knees.  Pt without memory of episode and regained consciousness when EMS responded to her house. Pt endorsing mild TTP to chin.  Denies chest pain, SOB, orthopnea, PND, LE edema, palpitations, N/V/D, melena, hematuria, or headache. In ED /72, HR 72, RR 16, O2 98% RA, T 97.6, labs significant for trop negative x1.  CT head and CT maxillofacial without acute fracture or bleed. EKG NSR @ 79 bpm with 1st degree AV block, PVC TWI V5-V6 and  (unchanged from prior EKG). Pacemaker interrogated in ED by EP and functioning normally. Hydralazine 5 mg IV x1 given in ED for 's with SBP currently 110's. Pt with another syncopal episode in ED witnessed by son occurring when pt moved from laying to sitting position while attempting to use the restroom lasting for a few seconds. Pt endorses her vision becoming black and feeling unwell prior to the episode. Pt remained in the bed during the event, VSS during the event. Pt now admitted to 5 [facility] for syncope workup. Pt found to be orthostatic positive s/p IVF Hydration with improvement. Pts home Triamterene/HCTZ discontinue and home Bystolic decreased to 10mg QD. EP consulted and interrogated pacemaker with no signs of arrythmia noted. Neuro evaluated pt and deemed syncope 2/2 orthostatic hypotension. Pt had a Carotid US which was normal. Pt seen and examined at bedside this AM without any complaints. VSS, labs and telemetry reviewed and pt stable for discharge as discussed with Dr. Hawkins. Pt has been given appropriate discharge instructions, including medication regimen and folllow up with Dr. Hawkins on 8/28/19. Pt HHA 85 y/o F with PMHx of breast cancer s/p L mastectomy 2016, AFib on coumadin (s/p [employer] pacemaker), HTN, HLD presents to ED with syncopal episode today after discharged yesterday 8/20/19 for syncopal workup including a normal echocardiogram and normal pacemaker interrogation. Per visiting nurse,  witnessed syncopal episode today without any prodrome when pt was walking to kitchen, collapsed to ground and hitting her chin and knees.  Pt without memory of episode and regained consciousness when EMS responded to her house. Pt endorsing mild TTP to chin.  Denies chest pain, SOB, orthopnea, PND, LE edema, palpitations, N/V/D, melena, hematuria, or headache. In ED /72, HR 72, RR 16, O2 98% RA, T 97.6, labs significant for trop negative x1.  CT head and CT maxillofacial without acute fracture or bleed. EKG NSR @ 79 bpm with 1st degree AV block, PVC TWI V5-V6 and  (unchanged from prior EKG). Pacemaker interrogated in ED by EP and functioning normally. Hydralazine 5 mg IV x1 given in ED for 's with SBP currently 110's. Pt with another syncopal episode in ED witnessed by son occurring when pt moved from laying to sitting position while attempting to use the restroom lasting for a few seconds. Pt endorses her vision becoming black and feeling unwell prior to the episode. Pt remained in the bed during the event, VSS during the event. Pt now admitted to 5 [facility] for syncope workup. Pt found to be orthostatic positive s/p IVF Hydration with improvement. Pts home Triamterene/HCTZ discontinue and home Bystolic decreased to 10mg QD. EP consulted and interrogated pacemaker with no signs of arrythmia noted. Neuro evaluated pt and deemed syncope 2/2 orthostatic hypotension. Pt had a Carotid US which was normal. Pt seen and examined at bedside this AM without any complaints. VSS, labs and telemetry reviewed and pt stable for discharge as discussed with Dr. Hawkins. Pt has been given appropriate discharge instructions, including medication regimen and folllow up with Dr. Hawkins on 8/28/19. Pts 24hr HHA reinstated prior to discharge.

## 2019-08-23 NOTE — PROGRESS NOTE ADULT - PROBLEM SELECTOR PLAN 1
Patient orthostatic this morning. Given additional fluid orally.  -neurology; no additional imaging or workup; believe syncope due to orthostatic hypotension  -EP: counseled patient on management of orthostatic hypotension  -Remains intermittently orthostatic; decreased bystolic from 20mg qday to 10mg qd starting tomorrrow  -75cc/hr NS overnight  -repeat orthostatics in the AM  - c/w mIVF with normal saline at 75cc/hr   - continue working with PT  - continue to monitor on tele Patient orthostatic this morning. Given additional fluid orally.  -neurology; no additional imaging or workup; believe syncope due to orthostatic hypotension  -EP: counseled patient on management of orthostatic hypotension  -Remains intermittently orthostatic; decreased bystolic from 20mg qday to 10mg qd starting tomorrrow  -75cc/hr NS overnight  -repeat orthostatics in the AM  - c/w mIVF with normal saline at 75cc/hr   - continue working with PT  - TEDs b/l lower extremities  - continue to monitor on tele Patient orthostatic this morning. Given additional fluid orally.  -neurology; no additional imaging or workup; believe syncope due to orthostatic hypotension  -EP: counseled patient on management of orthostatic hypotension  -Remains intermittently orthostatic; decreased bystolic from 20mg qday to 10mg qd starting tomorrrow  -75cc/hr NS overnight  -repeat orthostatics in the AM  - continue working with PT  - TEDs b/l lower extremities  - continue to monitor on tele Patient orthostatic this morning. Given additional fluid orally.  -neurology; no additional imaging or workup; believe syncope due to orthostatic hypotension  -EP: counseled patient on management of orthostatic hypotension  -Remains intermittently orthostatic; decreased bystolic from 20mg qday to 10mg qd starting tomorrrow  -carotid ultrasound performed 8/23: without occlusion  -75cc/hr NS overnight  -repeat orthostatics in the AM  - continue working with PT  - TEDs b/l lower extremities  - continue to monitor on tele Patient orthostatic this morning. Given additional fluid orally.  -neurology; no additional imaging or workup; believe syncope due to orthostatic hypotension  -EP: counseled patient on management of orthostatic hypotension  -Remains intermittently orthostatic; decreased bystolic from 20mg qday to 10mg qd starting tomorrrow  -carotid ultrasound performed 8/23: no significant stenosis  -75cc/hr NS overnight  -repeat orthostatics in the AM  - continue working with PT  - TEDs b/l lower extremities  - continue to monitor on tele

## 2019-08-23 NOTE — DISCHARGE NOTE PROVIDER - NSDCFUADDAPPT_GEN_ALL_CORE_FT
Please follow up with Dr. Hawkins within one week of discharge. Please call Dr. Hawkins's office to make an appointment. Please follow up with Dr. Hawkins on Wednesday 8/28/19, she will be calling you to give you a time for your appointment.

## 2019-08-23 NOTE — PROGRESS NOTE ADULT - PROBLEM SELECTOR PLAN 5
continue home synthroid    DVT ppx: coumadin  Dispo: pending syncope workup
continue home synthroid    DVT ppx: coumadin  Dispo: Anticipate tomorrow AM pending negative orthostatics

## 2019-08-23 NOTE — PROGRESS NOTE ADULT - PROBLEM SELECTOR PLAN 3
Patient's syncope may have component of orthostatic hypotension. Patient's home anti-hypertensive regimen of bystolic 20mg qd and diazide 37.5/25  - discontinue diazide 37.5/25  - if patient becomes hypertensive with systolics greater than 150, can start low dose ACE-I
Patient's syncope may have component of orthostatic hypotension. Patient's home anti-hypertensive regimen of bystolic 20mg qd and diazide 37.5/25  -decreased bystolic from 20mg to 10mg qday to start tomorrow due to ongoing orthostatics

## 2019-08-23 NOTE — PROGRESS NOTE ADULT - SUBJECTIVE AND OBJECTIVE BOX
OVERNIGHT EVENTS:    SUBJECTIVE / INTERVAL HPI: Patient seen and examined at bedside.     VITAL SIGNS:  Vital Signs Last 24 Hrs  T(C): 36.8 (23 Aug 2019 14:11), Max: 36.8 (22 Aug 2019 19:01)  T(F): 98.2 (23 Aug 2019 14:11), Max: 98.2 (22 Aug 2019 19:01)  HR: 60 (23 Aug 2019 14:50) (59 - 72)  BP: 132/63 (23 Aug 2019 14:50) (123/54 - 168/13)  BP(mean): --  RR: 19 (23 Aug 2019 14:45) (16 - 19)  SpO2: 93% (23 Aug 2019 06:05) (93% - 96%)    PHYSICAL EXAM:    General: WDWN  HEENT: NC/AT; PERRL, anicteric sclera; MMM  Neck: supple  Cardiovascular: +S1/S2; RRR  Respiratory: CTA B/L; no W/R/R  Gastrointestinal: soft, NT/ND; +BSx4  Extremities: WWP; no edema, clubbing or cyanosis  Vascular: 2+ radial, DP/PT pulses B/L  Neurological: AAOx3; no focal deficits    MEDICATIONS:  MEDICATIONS  (STANDING):  allopurinol 100 milliGRAM(s) Oral daily  atorvastatin 10 milliGRAM(s) Oral at bedtime  escitalopram 10 milliGRAM(s) Oral daily  folic acid 1 milliGRAM(s) Oral daily  letrozole 2.5 milliGRAM(s) Oral daily  levothyroxine 88 MICROGram(s) Oral daily  multivitamin 1 Tablet(s) Oral daily  sodium chloride 0.9%. 1000 milliLiter(s) (75 mL/Hr) IV Continuous <Continuous>    MEDICATIONS  (PRN):      ALLERGIES:  Allergies    adhesives (Unknown)  latex (Rash; Urticaria)  No Known Drug Allergies    Intolerances        LABS:                        12.2   10.19 )-----------( 239      ( 22 Aug 2019 12:53 )             37.5     08-23    138  |  106  |  22  ----------------------------<  84  4.0   |  25  |  1.07    Ca    9.7      23 Aug 2019 06:24  Mg     2.2     08-23      PT/INR - ( 23 Aug 2019 06:24 )   PT: 34.5 sec;   INR: 2.95          PTT - ( 23 Aug 2019 06:24 )  PTT:37.6 sec    CAPILLARY BLOOD GLUCOSE              RADIOLOGY & ADDITIONAL TESTS: Reviewed.      ASSESSMENT:    PLAN: OVERNIGHT EVENTS: No acute overnight events.     SUBJECTIVE / INTERVAL HPI: Patient seen and examined at bedside. Patient without complaints this morning.     VITAL SIGNS:  Vital Signs Last 24 Hrs  T(C): 36.8 (23 Aug 2019 14:11), Max: 36.8 (22 Aug 2019 19:01)  T(F): 98.2 (23 Aug 2019 14:11), Max: 98.2 (22 Aug 2019 19:01)  HR: 60 (23 Aug 2019 14:50) (59 - 72)  BP: 132/63 (23 Aug 2019 14:50) (123/54 - 168/13)  RR: 19 (23 Aug 2019 14:45) (16 - 19)  SpO2: 93% (23 Aug 2019 06:05) (93% - 96%)    PHYSICAL EXAM:  General: elderly female  HEENT: large ecchymosis on chin and anterior portion of neck  Neck: supple without JVD  Cardiovascular: +S1/S2; bradycardic  Respiratory: CTA B/L; no W/R/R  Gastrointestinal: soft, NT/ND; +BSx4 normoactive  Extremities: WWP; no edema, clubbing or cyanosis  Vascular: 2+ radial and pedal pulses  Neurological: AAOx3; no focal deficits    MEDICATIONS:  MEDICATIONS  (STANDING):  allopurinol 100 milliGRAM(s) Oral daily  atorvastatin 10 milliGRAM(s) Oral at bedtime  escitalopram 10 milliGRAM(s) Oral daily  folic acid 1 milliGRAM(s) Oral daily  letrozole 2.5 milliGRAM(s) Oral daily  levothyroxine 88 MICROGram(s) Oral daily  multivitamin 1 Tablet(s) Oral daily  sodium chloride 0.9%. 1000 milliLiter(s) (75 mL/Hr) IV Continuous <Continuous>    MEDICATIONS  (PRN):      ALLERGIES:  Allergies    adhesives (Unknown)  latex (Rash; Urticaria)  No Known Drug Allergies    Intolerances        LABS:                        12.2   10.19 )-----------( 239      ( 22 Aug 2019 12:53 )             37.5     08-23    138  |  106  |  22  ----------------------------<  84  4.0   |  25  |  1.07    Ca    9.7      23 Aug 2019 06:24  Mg     2.2     08-23      PT/INR - ( 23 Aug 2019 06:24 )   PT: 34.5 sec;   INR: 2.95          PTT - ( 23 Aug 2019 06:24 )  PTT:37.6 sec    CAPILLARY BLOOD GLUCOSE      RADIOLOGY & ADDITIONAL TESTS: Reviewed.

## 2019-08-23 NOTE — PROGRESS NOTE ADULT - SUBJECTIVE AND OBJECTIVE BOX
Neurology  Progress Note  08-23-19    Name:  SANDRA BEHRLE; 86y    Interval History: Pt is seen and examined a her bedside. She was A&O times 3, with no acute complaints. denied any fall or syncope since yesterday. denied dizziness, headache, nausea, vomiting, urinary symptoms. Her vitals have been stable in the past 24 hours.     Medications:  allopurinol 100 milliGRAM(s) Oral daily  atorvastatin 10 milliGRAM(s) Oral at bedtime  BACItracin   Ointment 1 Application(s) Topical once  escitalopram 10 milliGRAM(s) Oral daily  folic acid 1 milliGRAM(s) Oral daily  hydrALAZINE Injectable 5 milliGRAM(s) IV Push once  letrozole 2.5 milliGRAM(s) Oral daily  levothyroxine 88 MICROGram(s) Oral daily  magnesium sulfate  IVPB 2 Gram(s) IV Intermittent once  multivitamin 1 Tablet(s) Oral daily  nebivolol 20 milliGRAM(s) Oral daily  potassium chloride    Tablet ER 40 milliEquivalent(s) Oral once  sodium chloride 0.9%. 1000 milliLiter(s) IV Continuous <Continuous>  warfarin 1.25 milliGRAM(s) Oral once  warfarin 1.25 milliGRAM(s) Oral once    Vitals:  T(C): 36.8 (08-23-19 @ 14:11), Max: 36.8 (08-22-19 @ 19:01)  HR: 60 (08-23-19 @ 14:50) (59 - 72)  BP: 132/63 (08-23-19 @ 14:50) (108/54 - 168/13)  RR: 19 (08-23-19 @ 14:45) (16 - 19)  SpO2: 93% (08-23-19 @ 06:05) (93% - 100%)    Labs:                        12.2   10.19 )-----------( 239      ( 22 Aug 2019 12:53 )             37.5     08-23    138  |  106  |  22  ----------------------------<  84  4.0   |  25  |  1.07    Ca    9.7      23 Aug 2019 06:24  Mg     2.2     08-23      CAPILLARY BLOOD GLUCOSE            PT/INR - ( 23 Aug 2019 06:24 )   PT: 34.5 sec;   INR: 2.95          PTT - ( 23 Aug 2019 06:24 )  PTT:37.6 sec  CSF:                  PHYSICAL EXAMINATION:  General: Well-developed, well nourished, in no acute distress.  Eyes: Conjunctiva and sclera clear.  Neurologic:  - Mental Status:  Alert, awake, oriented to person, place, and time; Speech is fluent with intact naming, repetition, and comprehension; Good overall fund of knowledge; Immediate recall is 3/3 words and delayed recall is 3/3 words at 5 minutes; Able to spell WORLD backwards and perform serial 7 subtraction; Able to read and write a sentence.  - Cranial Nerves II-XII:    II:  Visual fields are full to confronation; Pupils are equal, round, and reactive to light; Visual acuity is 20/20 bilaterally; Fundoscopic exam is normal with sharp discs.  III, IV, VI:  Extraocular movements are intact without nystagmus.  V:  Facial sensation is intact in the V1-V3 distribution bilaterally.  VII:  Face is symmetric with normal eye closure and smile  VIII:  Hearing is intact to finger rub.  IX, X:  Uvula is midline and soft palate rises symmetrically  XI:  Head turning and shoulder shrug are intact.  XII:  Tongue protudes in the midline.  - Motor:  Strength is 5/5 throughout.  There is no prontator drift.  Normal muscle bulk and tone throughout.  - Reflexes:  2+ and symmetric at the biceps, triceps, brachioradialis, knees, and ankles.  Plantar responses flexor.  - Sensory:  Intact throughout to vibration, and joint-position, light touch, pin prick.  - Coordination:  Finger-nose-finger and heel-knee-shin intact without dysmetria.  Rapid alternating hand movements intact.  - Gait:   Normal steps, base, arm swing, and turning.  Heel and toe walking are normal.  Tandem gait is normal.  Romberg testing is negative.    Radiology:  CT Head No Cont:  (21 Aug 2019 17:33)  CT Head No Cont:  (19 Aug 2019 12:48) Neurology  Progress Note  08-23-19    Name:  SANDRA BEHRLE; 86y    Interval History: Pt is seen and examined a her bedside. She was A&O times 3, with no acute complaints. denied any fall or syncope since yesterday. denied dizziness, headache, nausea, vomiting, urinary symptoms. Her vitals have been stable in the past 24 hours.     Medications:  allopurinol 100 milliGRAM(s) Oral daily  atorvastatin 10 milliGRAM(s) Oral at bedtime  BACItracin   Ointment 1 Application(s) Topical once  escitalopram 10 milliGRAM(s) Oral daily  folic acid 1 milliGRAM(s) Oral daily  hydrALAZINE Injectable 5 milliGRAM(s) IV Push once  letrozole 2.5 milliGRAM(s) Oral daily  levothyroxine 88 MICROGram(s) Oral daily  magnesium sulfate  IVPB 2 Gram(s) IV Intermittent once  multivitamin 1 Tablet(s) Oral daily  nebivolol 20 milliGRAM(s) Oral daily  potassium chloride    Tablet ER 40 milliEquivalent(s) Oral once  sodium chloride 0.9%. 1000 milliLiter(s) IV Continuous <Continuous>  warfarin 1.25 milliGRAM(s) Oral once  warfarin 1.25 milliGRAM(s) Oral once    Vitals:  T(C): 36.8 (08-23-19 @ 14:11), Max: 36.8 (08-22-19 @ 19:01)  HR: 60 (08-23-19 @ 14:50) (59 - 72)  BP: 132/63 (08-23-19 @ 14:50) (108/54 - 168/13)  RR: 19 (08-23-19 @ 14:45) (16 - 19)  SpO2: 93% (08-23-19 @ 06:05) (93% - 100%)    Labs:                        12.2   10.19 )-----------( 239      ( 22 Aug 2019 12:53 )             37.5     08-23    138  |  106  |  22  ----------------------------<  84  4.0   |  25  |  1.07    Ca    9.7      23 Aug 2019 06:24  Mg     2.2     08-23      CAPILLARY BLOOD GLUCOSE            PT/INR - ( 23 Aug 2019 06:24 )   PT: 34.5 sec;   INR: 2.95          PTT - ( 23 Aug 2019 06:24 )  PTT:37.6 sec  CSF:                  PHYSICAL EXAMINATION:  Constitutional: WDWN; NAD  Eyes: Conjunctiva and sclera clear.  Cardiovascular: Regular rate and rhythm; S1 and S2 Normal; No murmurs, gallops or rubs.  Neurologic:  - Mental Status:  AAOx3; speech is fluent with intact naming, repetition, and comprehension; Good overall fund of knowledge.  - Cranial Nerves II-XII:    II:   Visual fields are full to confrontation; Fundoscopic exam is normal with sharp discs; Pupils are equal, round, and reactive to light.  III, IV, VI:  Extraocular movements are intact without nystagmus.  V:  Facial sensation is intact in the V1-V3 distribution bilaterally.  VII:  Face is symmetric with normal eye closure and smile  VIII:  Hearing is intact to finger rub.  IX, X:  Uvula is midline and soft palate rises symmetrically  XI:  Head turning and shoulder shrug are intact.  XII:  Tongue protrudes in the midline.  - Motor:  Strength is 5/5 throughout.  There is no pronator drift.  Normal muscle bulk and tone throughout.  - Reflexes:  2+ and symmetric at the biceps, triceps, brachioradialis, knees, and ankles.  Plantar responses flexor.  - Sensory:  Intact to light touch, pin prick, vibration, and joint-position sense throughout.  - Coordination:  Finger-nose-finger and heel-knee-shin intact without dysmetria.    - Gait:   deferred       Radiology:  CT Head No Cont:  (21 Aug 2019 17:33)  CT Head No Cont:  (19 Aug 2019 12:48)

## 2019-08-23 NOTE — PROGRESS NOTE ADULT - SUBJECTIVE AND OBJECTIVE BOX
OVERNIGHT EVENTS:    SUBJECTIVE / INTERVAL HPI: Patient seen and examined at bedside.     VITAL SIGNS:  Vital Signs Last 24 Hrs  T(C): 36.7 (23 Aug 2019 18:33), Max: 36.8 (22 Aug 2019 19:01)  T(F): 98 (23 Aug 2019 18:33), Max: 98.2 (22 Aug 2019 19:01)  HR: 60 (23 Aug 2019 14:50) (59 - 72)  BP: 132/63 (23 Aug 2019 14:50) (123/54 - 168/13)  BP(mean): --  RR: 19 (23 Aug 2019 14:45) (16 - 19)  SpO2: 93% (23 Aug 2019 06:05) (93% - 96%)    PHYSICAL EXAM:    General: WDWN  HEENT: NC/AT; PERRL, anicteric sclera; MMM  Neck: supple  Cardiovascular: +S1/S2; RRR  Respiratory: CTA B/L; no W/R/R  Gastrointestinal: soft, NT/ND; +BSx4  Extremities: WWP; no edema, clubbing or cyanosis  Vascular: 2+ radial, DP/PT pulses B/L  Neurological: AAOx3; no focal deficits    MEDICATIONS:  MEDICATIONS  (STANDING):  allopurinol 100 milliGRAM(s) Oral daily  atorvastatin 10 milliGRAM(s) Oral at bedtime  escitalopram 10 milliGRAM(s) Oral daily  folic acid 1 milliGRAM(s) Oral daily  letrozole 2.5 milliGRAM(s) Oral daily  levothyroxine 88 MICROGram(s) Oral daily  multivitamin 1 Tablet(s) Oral daily  sodium chloride 0.9%. 1000 milliLiter(s) (75 mL/Hr) IV Continuous <Continuous>    MEDICATIONS  (PRN):      ALLERGIES:  Allergies    adhesives (Unknown)  latex (Rash; Urticaria)  No Known Drug Allergies    Intolerances        LABS:                        12.2   10.19 )-----------( 239      ( 22 Aug 2019 12:53 )             37.5     08-23    138  |  106  |  22  ----------------------------<  84  4.0   |  25  |  1.07    Ca    9.7      23 Aug 2019 06:24  Mg     2.2     08-23      PT/INR - ( 23 Aug 2019 06:24 )   PT: 34.5 sec;   INR: 2.95          PTT - ( 23 Aug 2019 06:24 )  PTT:37.6 sec    CAPILLARY BLOOD GLUCOSE              RADIOLOGY & ADDITIONAL TESTS: Reviewed.      ASSESSMENT:    PLAN:

## 2020-01-01 ENCOUNTER — EMERGENCY (EMERGENCY)
Facility: HOSPITAL | Age: 85
LOS: 1 days | End: 2020-01-01
Attending: EMERGENCY MEDICINE | Admitting: EMERGENCY MEDICINE
Payer: MEDICARE

## 2020-01-01 VITALS
RESPIRATION RATE: 16 BRPM | DIASTOLIC BLOOD PRESSURE: 107 MMHG | OXYGEN SATURATION: 100 % | SYSTOLIC BLOOD PRESSURE: 173 MMHG | HEART RATE: 107 BPM

## 2020-01-01 VITALS
RESPIRATION RATE: 14 BRPM | OXYGEN SATURATION: 100 % | HEART RATE: 87 BPM | DIASTOLIC BLOOD PRESSURE: 88 MMHG | SYSTOLIC BLOOD PRESSURE: 191 MMHG

## 2020-01-01 DIAGNOSIS — Z91.040 LATEX ALLERGY STATUS: ICD-10-CM

## 2020-01-01 DIAGNOSIS — R41.82 ALTERED MENTAL STATUS, UNSPECIFIED: ICD-10-CM

## 2020-01-01 DIAGNOSIS — Z90.12 ACQUIRED ABSENCE OF LEFT BREAST AND NIPPLE: Chronic | ICD-10-CM

## 2020-01-01 DIAGNOSIS — Z79.899 OTHER LONG TERM (CURRENT) DRUG THERAPY: ICD-10-CM

## 2020-01-01 DIAGNOSIS — Z90.49 ACQUIRED ABSENCE OF OTHER SPECIFIED PARTS OF DIGESTIVE TRACT: Chronic | ICD-10-CM

## 2020-01-01 DIAGNOSIS — Z11.59 ENCOUNTER FOR SCREENING FOR OTHER VIRAL DISEASES: ICD-10-CM

## 2020-01-01 DIAGNOSIS — I62.9 NONTRAUMATIC INTRACRANIAL HEMORRHAGE, UNSPECIFIED: ICD-10-CM

## 2020-01-01 DIAGNOSIS — Z92.3 PERSONAL HISTORY OF IRRADIATION: Chronic | ICD-10-CM

## 2020-01-01 DIAGNOSIS — Z79.01 LONG TERM (CURRENT) USE OF ANTICOAGULANTS: ICD-10-CM

## 2020-01-01 DIAGNOSIS — I10 ESSENTIAL (PRIMARY) HYPERTENSION: ICD-10-CM

## 2020-01-01 DIAGNOSIS — Z91.048 OTHER NONMEDICINAL SUBSTANCE ALLERGY STATUS: ICD-10-CM

## 2020-01-01 DIAGNOSIS — Z20.828 CONTACT WITH AND (SUSPECTED) EXPOSURE TO OTHER VIRAL COMMUNICABLE DISEASES: ICD-10-CM

## 2020-01-01 LAB
ALBUMIN SERPL ELPH-MCNC: 3.6 G/DL — SIGNIFICANT CHANGE UP (ref 3.3–5)
ALP SERPL-CCNC: 82 U/L — SIGNIFICANT CHANGE UP (ref 40–120)
ALT FLD-CCNC: 13 U/L — SIGNIFICANT CHANGE UP (ref 10–45)
ANION GAP SERPL CALC-SCNC: 16 MMOL/L — SIGNIFICANT CHANGE UP (ref 5–17)
APPEARANCE UR: ABNORMAL
APTT BLD: 53.3 SEC — HIGH (ref 27.5–36.3)
AST SERPL-CCNC: 29 U/L — SIGNIFICANT CHANGE UP (ref 10–40)
BASE EXCESS BLDV CALC-SCNC: 3.3 MMOL/L — SIGNIFICANT CHANGE UP
BASOPHILS # BLD AUTO: 0.06 K/UL — SIGNIFICANT CHANGE UP (ref 0–0.2)
BASOPHILS NFR BLD AUTO: 0.4 % — SIGNIFICANT CHANGE UP (ref 0–2)
BILIRUB SERPL-MCNC: 1.5 MG/DL — HIGH (ref 0.2–1.2)
BILIRUB UR-MCNC: NEGATIVE — SIGNIFICANT CHANGE UP
BUN SERPL-MCNC: 15 MG/DL — SIGNIFICANT CHANGE UP (ref 7–23)
CALCIUM SERPL-MCNC: 10 MG/DL — SIGNIFICANT CHANGE UP (ref 8.4–10.5)
CHLORIDE SERPL-SCNC: 96 MMOL/L — SIGNIFICANT CHANGE UP (ref 96–108)
CK SERPL-CCNC: 34 U/L — SIGNIFICANT CHANGE UP (ref 25–170)
CO2 SERPL-SCNC: 25 MMOL/L — SIGNIFICANT CHANGE UP (ref 22–31)
COLOR SPEC: YELLOW — SIGNIFICANT CHANGE UP
CREAT SERPL-MCNC: 0.92 MG/DL — SIGNIFICANT CHANGE UP (ref 0.5–1.3)
CRP SERPL-MCNC: 1.13 MG/DL — HIGH (ref 0–0.4)
D DIMER BLD IA.RAPID-MCNC: 2555 NG/ML DDU — HIGH
DIFF PNL FLD: ABNORMAL
DIGOXIN SERPL-MCNC: <0.3 NG/ML — LOW (ref 0.8–2)
EOSINOPHIL # BLD AUTO: 0.27 K/UL — SIGNIFICANT CHANGE UP (ref 0–0.5)
EOSINOPHIL NFR BLD AUTO: 1.6 % — SIGNIFICANT CHANGE UP (ref 0–6)
FERRITIN SERPL-MCNC: 563 NG/ML — HIGH (ref 15–150)
FIBRINOGEN PPP-MCNC: 414 MG/DL — SIGNIFICANT CHANGE UP (ref 258–438)
GAS PNL BLDV: SIGNIFICANT CHANGE UP
GLUCOSE SERPL-MCNC: 137 MG/DL — HIGH (ref 70–99)
GLUCOSE UR QL: NEGATIVE — SIGNIFICANT CHANGE UP
HCO3 BLDV-SCNC: 29 MMOL/L — HIGH (ref 20–27)
HCT VFR BLD CALC: 38.7 % — SIGNIFICANT CHANGE UP (ref 34.5–45)
HGB BLD-MCNC: 12.5 G/DL — SIGNIFICANT CHANGE UP (ref 11.5–15.5)
IMM GRANULOCYTES NFR BLD AUTO: 0.4 % — SIGNIFICANT CHANGE UP (ref 0–1.5)
INR BLD: 7.76 — CRITICAL HIGH (ref 0.88–1.16)
KETONES UR-MCNC: ABNORMAL MG/DL
LACTATE SERPL-SCNC: 2 MMOL/L — SIGNIFICANT CHANGE UP (ref 0.5–2)
LDH SERPL L TO P-CCNC: SIGNIFICANT CHANGE UP (ref 50–242)
LEUKOCYTE ESTERASE UR-ACNC: NEGATIVE — SIGNIFICANT CHANGE UP
LYMPHOCYTES # BLD AUTO: 24.8 % — SIGNIFICANT CHANGE UP (ref 13–44)
LYMPHOCYTES # BLD AUTO: 4.09 K/UL — HIGH (ref 1–3.3)
MCHC RBC-ENTMCNC: 27.6 PG — SIGNIFICANT CHANGE UP (ref 27–34)
MCHC RBC-ENTMCNC: 32.3 GM/DL — SIGNIFICANT CHANGE UP (ref 32–36)
MCV RBC AUTO: 85.4 FL — SIGNIFICANT CHANGE UP (ref 80–100)
MONOCYTES # BLD AUTO: 1.42 K/UL — HIGH (ref 0–0.9)
MONOCYTES NFR BLD AUTO: 8.6 % — SIGNIFICANT CHANGE UP (ref 2–14)
NEUTROPHILS # BLD AUTO: 10.57 K/UL — HIGH (ref 1.8–7.4)
NEUTROPHILS NFR BLD AUTO: 64.2 % — SIGNIFICANT CHANGE UP (ref 43–77)
NITRITE UR-MCNC: NEGATIVE — SIGNIFICANT CHANGE UP
NRBC # BLD: 0 /100 WBCS — SIGNIFICANT CHANGE UP (ref 0–0)
NT-PROBNP SERPL-SCNC: 993 PG/ML — HIGH (ref 0–300)
PCO2 BLDV: 51 MMHG — SIGNIFICANT CHANGE UP (ref 41–51)
PH BLDV: 7.38 — SIGNIFICANT CHANGE UP (ref 7.32–7.43)
PH UR: 6 — SIGNIFICANT CHANGE UP (ref 5–8)
PLATELET # BLD AUTO: 376 K/UL — SIGNIFICANT CHANGE UP (ref 150–400)
PO2 BLDV: 47 MMHG — SIGNIFICANT CHANGE UP
POTASSIUM SERPL-MCNC: 2.7 MMOL/L — CRITICAL LOW (ref 3.5–5.3)
POTASSIUM SERPL-SCNC: 2.7 MMOL/L — CRITICAL LOW (ref 3.5–5.3)
PROCALCITONIN SERPL-MCNC: 0.05 NG/ML — SIGNIFICANT CHANGE UP (ref 0.02–0.1)
PROT SERPL-MCNC: 6.4 G/DL — SIGNIFICANT CHANGE UP (ref 6–8.3)
PROT UR-MCNC: 100 MG/DL
PROTHROM AB SERPL-ACNC: 94.3 SEC — HIGH (ref 10–12.9)
RBC # BLD: 4.53 M/UL — SIGNIFICANT CHANGE UP (ref 3.8–5.2)
RBC # FLD: 14.2 % — SIGNIFICANT CHANGE UP (ref 10.3–14.5)
SAO2 % BLDV: 78 % — SIGNIFICANT CHANGE UP
SARS-COV-2 RNA SPEC QL NAA+PROBE: DETECTED
SODIUM SERPL-SCNC: 137 MMOL/L — SIGNIFICANT CHANGE UP (ref 135–145)
SP GR SPEC: >=1.03 — SIGNIFICANT CHANGE UP (ref 1–1.03)
TROPONIN T SERPL-MCNC: <0.01 NG/ML — SIGNIFICANT CHANGE UP (ref 0–0.01)
UROBILINOGEN FLD QL: 0.2 E.U./DL — SIGNIFICANT CHANGE UP
WBC # BLD: 16.48 K/UL — HIGH (ref 3.8–10.5)
WBC # FLD AUTO: 16.48 K/UL — HIGH (ref 3.8–10.5)

## 2020-01-01 PROCEDURE — 31500 INSERT EMERGENCY AIRWAY: CPT

## 2020-01-01 PROCEDURE — 85730 THROMBOPLASTIN TIME PARTIAL: CPT

## 2020-01-01 PROCEDURE — 96375 TX/PRO/DX INJ NEW DRUG ADDON: CPT | Mod: XU

## 2020-01-01 PROCEDURE — 83615 LACTATE (LD) (LDH) ENZYME: CPT

## 2020-01-01 PROCEDURE — 85379 FIBRIN DEGRADATION QUANT: CPT

## 2020-01-01 PROCEDURE — 96374 THER/PROPH/DIAG INJ IV PUSH: CPT | Mod: XU

## 2020-01-01 PROCEDURE — 82962 GLUCOSE BLOOD TEST: CPT

## 2020-01-01 PROCEDURE — 93010 ELECTROCARDIOGRAM REPORT: CPT | Mod: 59

## 2020-01-01 PROCEDURE — 70450 CT HEAD/BRAIN W/O DYE: CPT | Mod: 26

## 2020-01-01 PROCEDURE — 85384 FIBRINOGEN ACTIVITY: CPT

## 2020-01-01 PROCEDURE — 70450 CT HEAD/BRAIN W/O DYE: CPT

## 2020-01-01 PROCEDURE — 81001 URINALYSIS AUTO W/SCOPE: CPT

## 2020-01-01 PROCEDURE — 99285 EMERGENCY DEPT VISIT HI MDM: CPT | Mod: 25

## 2020-01-01 PROCEDURE — 36415 COLL VENOUS BLD VENIPUNCTURE: CPT

## 2020-01-01 PROCEDURE — 51702 INSERT TEMP BLADDER CATH: CPT | Mod: XU

## 2020-01-01 PROCEDURE — 93005 ELECTROCARDIOGRAM TRACING: CPT | Mod: XU

## 2020-01-01 PROCEDURE — 85610 PROTHROMBIN TIME: CPT

## 2020-01-01 PROCEDURE — 84484 ASSAY OF TROPONIN QUANT: CPT

## 2020-01-01 PROCEDURE — 87635 SARS-COV-2 COVID-19 AMP PRB: CPT

## 2020-01-01 PROCEDURE — 82728 ASSAY OF FERRITIN: CPT

## 2020-01-01 PROCEDURE — 82550 ASSAY OF CK (CPK): CPT

## 2020-01-01 PROCEDURE — 83605 ASSAY OF LACTIC ACID: CPT

## 2020-01-01 PROCEDURE — 84145 PROCALCITONIN (PCT): CPT

## 2020-01-01 PROCEDURE — 83880 ASSAY OF NATRIURETIC PEPTIDE: CPT

## 2020-01-01 PROCEDURE — 71045 X-RAY EXAM CHEST 1 VIEW: CPT

## 2020-01-01 PROCEDURE — 86140 C-REACTIVE PROTEIN: CPT

## 2020-01-01 PROCEDURE — 71045 X-RAY EXAM CHEST 1 VIEW: CPT | Mod: 26,76

## 2020-01-01 PROCEDURE — 43753 TX GASTRO INTUB W/ASP: CPT

## 2020-01-01 PROCEDURE — 85025 COMPLETE CBC W/AUTO DIFF WBC: CPT

## 2020-01-01 PROCEDURE — 80162 ASSAY OF DIGOXIN TOTAL: CPT

## 2020-01-01 PROCEDURE — 80053 COMPREHEN METABOLIC PANEL: CPT

## 2020-01-01 PROCEDURE — 82803 BLOOD GASES ANY COMBINATION: CPT

## 2020-01-01 PROCEDURE — 87040 BLOOD CULTURE FOR BACTERIA: CPT

## 2020-01-01 PROCEDURE — 87086 URINE CULTURE/COLONY COUNT: CPT

## 2020-01-01 RX ORDER — POTASSIUM CHLORIDE 20 MEQ
10 PACKET (EA) ORAL
Refills: 0 | Status: DISCONTINUED | OUTPATIENT
Start: 2020-01-01 | End: 2020-04-26

## 2020-01-01 RX ORDER — ROCURONIUM BROMIDE 10 MG/ML
100 VIAL (ML) INTRAVENOUS ONCE
Refills: 0 | Status: COMPLETED | OUTPATIENT
Start: 2020-01-01 | End: 2020-01-01

## 2020-01-01 RX ORDER — PIPERACILLIN AND TAZOBACTAM 4; .5 G/20ML; G/20ML
3.38 INJECTION, POWDER, LYOPHILIZED, FOR SOLUTION INTRAVENOUS ONCE
Refills: 0 | Status: DISCONTINUED | OUTPATIENT
Start: 2020-01-01 | End: 2020-01-01

## 2020-01-01 RX ORDER — PHYTONADIONE (VIT K1) 5 MG
5 TABLET ORAL ONCE
Refills: 0 | Status: COMPLETED | OUTPATIENT
Start: 2020-01-01 | End: 2020-01-01

## 2020-01-01 RX ORDER — ETOMIDATE 2 MG/ML
20 INJECTION INTRAVENOUS ONCE
Refills: 0 | Status: COMPLETED | OUTPATIENT
Start: 2020-01-01 | End: 2020-01-01

## 2020-01-01 RX ORDER — PROTHROMBIN COMPLEX CONCENTRATE (HUMAN) 25.5; 16.5; 24; 22; 22; 26 [IU]/ML; [IU]/ML; [IU]/ML; [IU]/ML; [IU]/ML; [IU]/ML
1500 POWDER, FOR SOLUTION INTRAVENOUS ONCE
Refills: 0 | Status: COMPLETED | OUTPATIENT
Start: 2020-01-01 | End: 2020-01-01

## 2020-01-01 RX ADMIN — ETOMIDATE 20 MILLIGRAM(S): 2 INJECTION INTRAVENOUS at 19:19

## 2020-01-01 RX ADMIN — PROTHROMBIN COMPLEX CONCENTRATE (HUMAN) 400 INTERNATIONAL UNIT(S): 25.5; 16.5; 24; 22; 22; 26 POWDER, FOR SOLUTION INTRAVENOUS at 20:03

## 2020-01-01 RX ADMIN — Medication 100 MILLIGRAM(S): at 19:21

## 2020-01-01 RX ADMIN — Medication 101 MILLIGRAM(S): at 20:04

## 2020-04-22 NOTE — ED PROVIDER NOTE - PHYSICAL EXAMINATION
R pupil dilated/misshapen, both pupils non-reactive.  Pt completely unresponsive to any stimuli.   coarse breath sounds b/l R pupil dilated/misshapen, L pupil constricted, both pupils non-reactive.  Pt completely unresponsive to any stimuli.   coarse breath sounds b/l

## 2020-04-22 NOTE — ED ADULT NURSE REASSESSMENT NOTE - NS ED NURSE REASSESS COMMENT FT1
Attending Dr. Lawler with RRT bedside withrew mechanical ventilation/ETT. NRB-FM applied with supplemental O2 at 15L/min.  Continuous cardiac, POx and intermittent NIBP measurements were maintained.   At 2111 patient no longer had a palpable carotid pulse, POx, NIBP readings were unobtainable. Dr. Lawler informed and confirmed via cardiac bedside sono TOD to be 2111  Patient's son was informed.   Most-mortem care rendered after confirming with attending there was no indication of endorsing to ME.  Peripheral IV access lines x 3, and lopez catheter were removed.   Organ Donor rep. informed. Confirmation of call # [phone]. Spoke with Brittany Rodriguez who stated given patient's age she was not a candidate for organ donation.   Belongins (clothing) were bagged and tagged and sent with patient to [facility] [place].

## 2020-04-22 NOTE — ED PROVIDER NOTE - OBJECTIVE STATEMENT
86F PMH breast cancer s/p L mastectomy 2016, AFib on coumadin (s/p [employer] pacemaker), HTN, HLD, CVA p/w AMS.   All hx provided by EMS: Aide called that pt had R facial droop, ~30min PTA. BEfore that pt was her usual self (A and O x3, able to ambulate). EMs arrived, pt minimally responsive, hypoxic to 70s, FSG wnl.   Attempted to call [facility] - [phone] --> went to voicemail which is full.  Left voicemail for [phone]  called [facility] [phone] - continuous ringing then disconnected.

## 2020-04-22 NOTE — ED ADULT NURSE REASSESSMENT NOTE - NS ED NURSE REASSESS COMMENT FT1
Pt intubated by MD Lawler. Etomidate 20mg given at 1919 and Pradeep 100 mg given at 1921. ETT 7.5 and 21 @ lip. Vent settings: RR: 14, PEEP: 1, VT: 400.

## 2020-04-22 NOTE — ED ADULT TRIAGE NOTE - CHIEF COMPLAINT QUOTE
pt BIBA with stroke symptoms , as per EMS , A states that at approx 17;45 , pt slumped to the right and suddenly stopped talking , pt with AMS , stroke code called in triage and pt transferred to ct scan

## 2020-04-22 NOTE — ED PROVIDER NOTE - CLINICAL SUMMARY MEDICAL DECISION MAKING FREE TEXT BOX
86F PMH breast cancer s/p L mastectomy 2016, AFib on coumadin (s/p [employer] pacemaker), HTN, HLD, CVA p/w AMS.   All hx provided by EMS: Aide called that pt had R facial droop, ~30min PTA. BEfore that pt was her usual self (A and O x3, able to ambulate). EMs arrived, pt minimally responsive, hypoxic to 70s, FSG wnl. Hypertensive, satting 100% on NRB, other vitals wnl. Exam as above.  Pt went straight to CT/code stroke activated. CT showing ICH.   Will reverse coumadin.   Possible COVID (given hypoxia).  Covid labs w/u.  Attempted to call family regarding GOC but unable to reach anyone yet.

## 2020-04-22 NOTE — ED PEDIATRIC NURSE REASSESSMENT NOTE - NS ED NURSE REASSESS COMMENT FT2
Attending Dr. Lawler with RRT bedside withrew mechanical ventilation/ETT. NRB-FM applied with supplemental O2 at 15L/min.  Continuous cardiac, POx and intermittent NIBP measurements were maintained.   At 2111 patient no longer had a palpable carotid pulse, POx, NIBP readings were unobtainable. Dr. Lawler informed and confirmed via cardiac bedside sono TOD to be 2111  Patient's son was informed.   Most-mortem care rendered after confirming with attending there was no indication of endorsing to ME.  Peripheral IV access lines x 3, and lopez catheter were removed.   Organ Donor rep. informed. Confirmation of call # [phone]. Spoke with Brittany Rodriguez who stated given patient's age she was not a candidate for organ donation.   Belongins (clothing) were bagged and tagged and sent with patient to [facility] [place].

## 2020-04-22 NOTE — ED ADULT NURSE NOTE - OBJECTIVE STATEMENT
85 y/o female w/ hx of breast ca w/ left sided mastectomy, afib on Coumadin, HTN, HLD, pacemaker presents to the ED via EMS c/o AMS and right sided facial droop. Pt typically AAOx3. As per EMS, pt minimally responsive when they arrived. Pt unresponsive upon arrival to ED. CT of head revealed large ICH. Pt has gasping respirations. Unable to complete further hx.

## 2020-04-22 NOTE — ED PROVIDER NOTE - PROGRESS NOTE DETAILS
[place]: INR reversed w/ vit K, PCC. Multiple failed attempts at getting through to family for GOC. Intubated for clinical course. L pupil now dilated as well. Bucky: son newton called back. updated him regarding grim prognosis and likely no recovery and death. states his mother was DNI/DNR and would want likely want ETT pulled out. However requested I also d/w his borther pascual - number [phone]. D/w pascual who will d/w newton and get back to me. [place]: rediscussed w/ sons - requesting terminal extubation and comfort care. they understand that pt will likely die w/i minutes to hrs/days. [place]: no pulses (+pacemaker activity on monitor), no cardiac rhythm on US. TOD 2111. Updated son newton [phone] who will call rest of family.

## 2020-04-22 NOTE — CONSULT NOTE ADULT - SUBJECTIVE AND OBJECTIVE BOX
**STROKE CODE CONSULT NOTE**    Last known well time/Time of onset of symptoms: 4/22/2020 1800    HPI: 86y Female with PMHx of 85 y/o F with PMHx of breast cancer s/p L mastectomy 2016, AFib on coumadin (s/p [employer] pacemaker, history of multiple falls), HTN, HLD who presented with right facial droop. EMS stated aide called EMS for right facial droop, prior to, pt was her usual self (A and O x3, able to ambulate). When EMS arrived, pt was minimally responsive, hypoxic to 70s, FSG wnl. Patient was taken to scan, stroke code called. NIHSS 34. ICH 6. HCT showed large right ICH likely basal ganglia in origin with IVH, and right to left midline shift with downward herniation (Dr. Damion mckeon). Patient was noted to begin having gasping respirations, unable to contact family members, Dr. Lawler to intubate patient. Unable to obtain ROS.     T(C): --  HR: 107 (04-22-20 @ 18:45) (107 - 107)  BP: 173/107 (04-22-20 @ 18:45) (173/107 - 173/107)  RR: 16 (04-22-20 @ 18:45) (16 - 16)  SpO2: 100% (04-22-20 @ 18:45) (100% - 100%)    PAST MEDICAL & SURGICAL HISTORY:  Pacemaker  HTN (hypertension)  Breast cancer  Hypothyroid  High cholesterol  Afib  S/P radioactive iodine thyroid ablation  S/P appendectomy  H/O left mastectomy    MEDICATIONS  (STANDING):  prothrombin complex concentrate IVPB (KCENTRA) 1500 International Unit(s) IV Intermittent once    MEDICATIONS  (PRN):    Allergies    adhesives (Unknown)  latex (Rash; Urticaria)  No Known Drug Allergies    Intolerances      Vital Signs Last 24 Hrs  T(C): --  T(F): --  HR: 107 (22 Apr 2020 18:45) (107 - 107)  BP: 173/107 (22 Apr 2020 18:45) (173/107 - 173/107)  BP(mean): --  RR: 16 (22 Apr 2020 18:45) (16 - 16)  SpO2: 100% (22 Apr 2020 18:45) (100% - 100%)    Physical exam:  Neurologic:  -Mental status: Comatose, eyes opened, does not track or follow commands   -Cranial nerves:   II: Blink to threat absent  III, IV, VI: Negative dolls eyes. Right surgical pupil, bilateral pupils fixed 5-6mm nonreactive to light  V:  Corneal reflex intact  VII: Right facial droop   Motor/Sensation: Absent, withdrawal bilateral arms and legs to noxious stimuli.   Reflexes: upgoing toes bilaterally     NIHSS: 34 ICH Score: 6    Fingerstick Blood Glucose: CAPILLARY BLOOD GLUCOSE      POCT Blood Glucose.: 134 mg/dL (22 Apr 2020 18:45)    LABS:                        12.5   16.48 )-----------( 376      ( 22 Apr 2020 18:58 )             38.7         RADIOLOGY & ADDITIONAL STUDIES:    HCT:     -----------------------------------------------------------------------------------------------------------------  IV-tPA (Y/N):    no  Reason IV-tPA not given: large right ICH    ASSESSMENT/PLAN:  86y Female with PMHx of 85 y/o F with PMHx of breast cancer s/p L mastectomy 2016, AFib on coumadin (s/p [employer] pacemaker, history of multiple falls), HTN, HLD who presented with right facial droop. EMS stated aide called EMS for right facial droop, prior to, pt was her usual self (A and O x3, able to ambulate). When EMS arrived, pt was minimally responsive, hypoxic to 70s, FSG wnl. Patient was taken to scan, stroke code called. NIHSS 34. ICH 6. HCT showed large right ICH likely basal ganglia in origin with IVH, and right to left midline shift with downward herniation (Dr. Damion mckeon). Patient not TPA canidate, as patient with ICH. Patient was noted to begin having gasping respirations, unable to contact family members, Dr. Lawler to intubate patient. Patient's bleed likely from coumadin possibly spontaneous (INR 7.76) less likely traumatic (unable to obtain future history). Patient with poor prognosis, likely not surgical candidate given extensive bleed.     - palliative care/ goals of care discussion  - consider reversal of coumadin with k-centra  - patient will need ICU if intubated  - contact Stroke Neurology as needed    Carie North  Neurology Stroke NP  [phone] **STROKE CODE CONSULT NOTE**    Last known well time/Time of onset of symptoms: 4/22/2020 1800    HPI: 86y Female with PMHx of 87 y/o F with PMHx of breast cancer s/p L mastectomy 2016, AFib on coumadin (s/p [employer] pacemaker, history of multiple falls), HTN, HLD who presented with right facial droop. EMS stated aide called EMS for right facial droop, prior to, pt was her usual self (A and O x3, able to ambulate). When EMS arrived, pt was minimally responsive, hypoxic to 70s, FSG wnl. Patient was taken to scan, stroke code called. NIHSS 34. ICH 6. HCT showed large right ICH likely basal ganglia in origin with IVH, and right to left midline shift with downward herniation (Dr. Damion mckeon). Patient was noted to begin having gasping respirations, unable to contact family members, Dr. Lawler to intubate patient. Unable to obtain ROS.     T(C): --  HR: 107 (04-22-20 @ 18:45) (107 - 107)  BP: 173/107 (04-22-20 @ 18:45) (173/107 - 173/107)  RR: 16 (04-22-20 @ 18:45) (16 - 16)  SpO2: 100% (04-22-20 @ 18:45) (100% - 100%)    PAST MEDICAL & SURGICAL HISTORY:  Pacemaker  HTN (hypertension)  Breast cancer  Hypothyroid  High cholesterol  Afib  S/P radioactive iodine thyroid ablation  S/P appendectomy  H/O left mastectomy    MEDICATIONS  (STANDING):  prothrombin complex concentrate IVPB (KCENTRA) 1500 International Unit(s) IV Intermittent once    MEDICATIONS  (PRN):    Allergies    adhesives (Unknown)  latex (Rash; Urticaria)  No Known Drug Allergies    Intolerances      Vital Signs Last 24 Hrs  T(C): --  T(F): --  HR: 107 (22 Apr 2020 18:45) (107 - 107)  BP: 173/107 (22 Apr 2020 18:45) (173/107 - 173/107)  BP(mean): --  RR: 16 (22 Apr 2020 18:45) (16 - 16)  SpO2: 100% (22 Apr 2020 18:45) (100% - 100%)    Physical exam:  Neurologic:  -Mental status: Comatose, eyes opened, does not track or follow commands   -Cranial nerves:   II: Blink to threat absent  III, IV, VI: Negative dolls eyes. Right surgical pupil, bilateral pupils fixed 5-6mm nonreactive to light  V:  Corneal reflex intact  VII: Right facial droop   Motor/Sensation: Absent, withdrawal bilateral arms and legs to noxious stimuli.   Reflexes: upgoing toes bilaterally     NIHSS: 34 ICH Score: 6    Fingerstick Blood Glucose: CAPILLARY BLOOD GLUCOSE      POCT Blood Glucose.: 134 mg/dL (22 Apr 2020 18:45)    LABS:                        12.5   16.48 )-----------( 376      ( 22 Apr 2020 18:58 )             38.7         RADIOLOGY & ADDITIONAL STUDIES:    HCT: follow up final read  Read per Dr. Bruno; large right ICH likely basal ganglia in origin with IVH, and right to left midline shift with downward herniation     -----------------------------------------------------------------------------------------------------------------  IV-tPA (Y/N):    no  Reason IV-tPA not given: large right ICH    ASSESSMENT/PLAN:  86y Female with PMHx of 87 y/o F with PMHx of breast cancer s/p L mastectomy 2016, AFib on coumadin (s/p [employer] pacemaker, history of multiple falls), HTN, HLD who presented with right facial droop. EMS stated aide called EMS for right facial droop, prior to, pt was her usual self (A and O x3, able to ambulate). When EMS arrived, pt was minimally responsive, hypoxic to 70s, FSG wnl. Patient was taken to scan, stroke code called. NIHSS 34. ICH 6. HCT showed large right ICH likely basal ganglia in origin with IVH, and right to left midline shift with downward herniation (Dr. Bruno read). Patient not TPA canidate, as patient with ICH. Patient was noted to begin having gasping respirations, unable to contact family members, Dr. Lawler to intubate patient. Patient's bleed likely from coumadin possibly spontaneous (INR 7.76) less likely traumatic (unable to obtain future history). Patient with poor prognosis, likely not surgical candidate given extensive bleed.     - palliative care/ goals of care discussion  - consider reversal of coumadin with k-centra  - patient will need ICU if intubated  - contact Stroke Neurology as needed    Carie North  Neurology Stroke NP  [phone]

## 2020-04-22 NOTE — ED ADULT NURSE REASSESSMENT NOTE - NS ED NURSE REASSESS COMMENT FT1
Post-Mortem Nurse Note: Post-Mortem Nurse Note:  Received patient from prior shift intubated on vent. Unresponsive to painful stimuli (without sedation). GCS:3. Bilateral pupils fixed and dilated. Attempting to reach family/next of kin regarding prognosis/plan of care/patient's wishes.   Dr. Lawler was able to reach patient's son via phone and prognosis and plan of care discussed, As discussed with attending and son agreed in relation to prognosis and quality of life, mechanical ventilation to be withdrawn and DNR/DNI initiated and maintained.   Prior to extubation comfort measures extended to patient's son who was offered and given the opportunity to speak to his mother via speakerphone.

## 2020-04-22 NOTE — ED ADULT NURSE NOTE - NSIMPLEMENTINTERV_GEN_ALL_ED
Implemented All Fall with Harm Risk Interventions:  [place] to call system. Call bell, personal items and telephone within reach. Instruct patient to call for assistance. Room bathroom lighting operational. Non-slip footwear when patient is off stretcher. Physically safe environment: no spills, clutter or unnecessary equipment. Stretcher in lowest position, wheels locked, appropriate side rails in place. Provide visual cue, wrist band, yellow gown, etc. Monitor gait and stability. Monitor for mental status changes and reorient to person, place, and time. Review medications for side effects contributing to fall risk. Reinforce activity limits and safety measures with patient and family. Provide visual clues: red socks.

## 2020-04-22 NOTE — ED PROCEDURE NOTE - CPROC ED NUMBER OF ATTEMPTS1
Dr. CARRIE Dior paged to notify her that patient's CXR is ready to be reviewed and Discharge Orders are needed for this patient.   1

## 2020-04-24 LAB
CULTURE RESULTS: NO GROWTH — SIGNIFICANT CHANGE UP
SPECIMEN SOURCE: SIGNIFICANT CHANGE UP

## 2020-04-26 LAB
CULTURE RESULTS: NO GROWTH — SIGNIFICANT CHANGE UP
CULTURE RESULTS: NO GROWTH — SIGNIFICANT CHANGE UP
SPECIMEN SOURCE: SIGNIFICANT CHANGE UP
SPECIMEN SOURCE: SIGNIFICANT CHANGE UP

## 2020-06-04 NOTE — STROKE CODE NOTE - DISPOSITION
Betsy Houser is a 80 year old female here for  Chief Complaint   Patient presents with   • Cancer     Denies latex allergy or sensitivity.    Medication verified, no changes.  PCP and Pharmacy verified.    Social History     Tobacco Use   Smoking Status Never Smoker   Smokeless Tobacco Never Used     Advance Directives Filed: No    ECOG:   ECOG [06/04/20 1138]   ECOG Performance Status 1       Height: No.  Ht Readings from Last 1 Encounters:   03/06/20 5' 1\" (1.549 m)     Weight:Yes, shoes off.  Wt Readings from Last 3 Encounters:   03/06/20 65.1 kg   02/05/20 64.9 kg   12/10/19 65 kg       BMI: There is no height or weight on file to calculate BMI.    REVIEW OF SYSTEMS  GENERAL:  Patient denies headache, chills, night sweats, change in appetite, weight loss, dizziness, but complains of: fevers and excessive fatigue  ALLERGIC/IMMUNOLOGIC: Verified allergies: Yes  EYES:  Patient denies significant visual difficulties, double vision, blurred vision  ENT/MOUTH: Patient denies problems with hearing, sore throat, sinus drainage, mouth sores  ENDOCRINE:  Patient denies diabetes, thyroid disease, hormone replacement, hot flashes, thyroid nodules  HEMATOLOGIC/LYMPHATIC: Patient denies bleeding, tender lymph nodes, swollen lymph nodes  BREASTS: Patient denies abnormal masses of breast, nipple discharge, pain, but complains of: abnormal masses of breast  RESPIRATORY:  Patient denies lung pain with breathing, cough, coughing up blood, shortness of breath  CARDIOVASCULAR:  Patient denies anginal chest pain, palpitations, shortness of breath when lying flat, peripheral edema  GASTROINTESTINAL: Patient denies abdominal pain , nausea, vomiting, GI bleeding, change in bowel habits, heartburn, sensation of feeling full, difficulty swallowing, but complains of: diarrhea, constipation and indigestion  : Patient denies abnormal genital masses, blood in the urine, frequency, urgency, burning with urination, hesitancy, incontinence,  vaginal bleeding, discharge  MUSCULOSKELETAL:  Patient denies joint pain, bone pain, joint swelling, redness, decreased range of motion  SKIN:  Patient denies chronic rashes, inflammation, ulcerations, skin changes, itching, ulceration (healing) on upper right buttock   NEUROLOGIC:  Patient denies loss of balance, areas of focal weakness, abnormal gait, sensory problems, numbness, tingling  PSYCHIATRIC: Patient denies insomnia, depression, anxiety     dispo pending GOC likely ICU/Intensive Care Unit

## 2021-05-10 NOTE — ED PROVIDER NOTE - CONSTITUTIONAL DISTRESS
Date form returned from provider: 5/ 7/ 2021   Comments Form received already completed and signed from Physician's office, and has been faxed to [facility]. Importing to encounter for record-keeping.      This form was not prepared by the Disability department.   MODERATE

## 2021-08-09 NOTE — ED ADULT NURSE NOTE - PMH
You have chosen to receive care through a telephone visit. Do you consent to use a telephone visit for your medical care today? Yes   Afib    Breast cancer    High cholesterol    HTN (hypertension)    Hypothyroid    Pacemaker

## 2021-10-05 NOTE — ED ADULT NURSE NOTE - NS PRO PASSIVE SMOKE EXP
INDICATION:



Patient fell about  a week ago; right flank and abdominal pain post fall; 

constipation.



Comparison: None.



TECHNIQUE:



CT abdomen and pelvis with intravenous contrast; coronal and sagittal 

reformats.



FINDINGS:



Fractures involving the right 9th, 10th and 11th posterior ribs with 

displacement. There is evidence of pleural effusion as well as subcutaneous 

emphysema involving the right upper posterior abdominal wall and  right 

lower posterior chest wall. Small pneumothorax identified at the right lung 

base. Compression atelectasis right lung base secondary to splinting as 

well as pleural effusion. No pneumothorax identified on the left side. 

Normal size cardiac silhouette without any evidence of pericardial effusion 

.no focal hepatic or splenic pathology. No pancreatic pathology. 

Gallbladder is unremarkable. No adrenal pathology. No kidney stones or 

obstructive uropathy. No retroperitoneal lymphadenopathy. No evidence of 

abdominal or pelvic ascites. No pneumoperitoneum or intestinal obstruction. 

Copious amounts of retained stool identified in the colon.



IMPRESSION:



1. Displaced fractures involving the right lower posterior rib cage with 

associated soft tissue contusion as well as right-sided pleural effusion 

and atelectasis.



2. Pneumothorax right chest.



3. Subcutaneous emphysema right posterior chest wall.



4. Suggest obtaining a dedicated CT chest for further assessment.



5. Copious amounts of retained stool identified in the colon .



Please note that all CT scans at this facility use dose modulation, 

iterative reconstruction, and/or weight-based dosing when appropriate to 

reduce radiation dose to as low as reasonably achievable.



Dictated by Jessica Pérez MD @ 10/5/2021 12:06:22 PM



(Electronically Signed)
No

## 2022-01-11 NOTE — ED ADULT TRIAGE NOTE - [facility] EMS AGENCY
A full discussion of the nature of anticoagulants has been carried out. A benefit risk analysis has been presented to the patient, so that they understand the justification for choosing anticoagulation at this time. The need for frequent and regular monitoring, precise dosage adjustment and compliance is stressed. Side effects of potential bleeding are discussed. The patient should avoid any OTC items containing aspirin, naproxen or ibuprofen, and should avoid great swings in general diet. Avoid alcohol consumption. Advised to notify the office if antibiotic or steroid therapy is initiated. Call if any signs of abnormal bleeding are present. Next PT/INR test in 3 weeks.
10 Y

## 2022-12-22 NOTE — ED ADULT NURSE NOTE - NSFALLRSKOUTCOME_ED_ALL_ED
Fall with Harm Risk Island Pedicle Flap With Canthal Suspension Text: The defect edges were debeveled with a #15 scalpel blade.  Given the location of the defect, shape of the defect and the proximity to free margins an island pedicle advancement flap was deemed most appropriate.  Using a sterile surgical marker, an appropriate advancement flap was drawn incorporating the defect, outlining the appropriate donor tissue and placing the expected incisions within the relaxed skin tension lines where possible. The area thus outlined was incised deep to adipose tissue with a #15 scalpel blade.  The skin margins were undermined to an appropriate distance in all directions around the primary defect and laterally outward around the island pedicle utilizing iris scissors.  There was minimal undermining beneath the pedicle flap. A suspension suture was placed in the canthal tendon to prevent tension and prevent ectropion.

## 2023-02-23 NOTE — PATIENT PROFILE ADULT - FAMILY NEEDS
[Follow-Up] : a follow-up visit [Pre-Visit Preparation] : pre-visit preparation was done [FreeTextEntry2] : notes, meds, labs reviewed no

## 2023-12-29 NOTE — ED ADULT TRIAGE NOTE - PAIN: PRESENCE, MLM
[Fatigue] : fatigue [Recent Change In Weight] : ~T no recent weight change [Chest Pain] : chest pain [Palpitations] : no palpitations [Leg Claudication] : no intermittent leg claudication [Lower Ext Edema] : no lower extremity edema complains of pain/discomfort [Shortness Of Breath] : no shortness of breath [Wheezing] : no wheezing [Cough] : cough [SOB on Exertion] : no shortness of breath during exertion [Abdominal Pain] : no abdominal pain [Vomiting] : no vomiting [Constipation] : no constipation [Diarrhea: Grade 0] : Diarrhea: Grade 0 [Confused] : no confusion [Dizziness] : no dizziness [Fainting] : fainting [Difficulty Walking] : no difficulty walking [Negative] : Psychiatric

## 2023-12-30 NOTE — DISCHARGE NOTE PROVIDER - NSDCCPCAREPLAN_GEN_ALL_CORE_FT
PRINCIPAL DISCHARGE DIAGNOSIS  Diagnosis: Syncope  Assessment and Plan of Treatment: You presented to the hospital after have a syncopal event  (loss of consciousness/fainting). We admitted you to a telemetry floor to check for any arrhythmias. We also did an echocardiogram to check your heart function.      SECONDARY DISCHARGE DIAGNOSES  Diagnosis: Afib  Assessment and Plan of Treatment: You have a history of atrial fibrillation and you take Coumadin. For this you have to have your INR checked regularly to make sure it is therapeutic. please follow up with your PMD/Cardiologist.    Diagnosis: HTN (hypertension)  Assessment and Plan of Treatment: You have a history of hypertension, please resume your current medications.    Diagnosis: High cholesterol  Assessment and Plan of Treatment: Please resume your Atorvastatin 10mg.    Diagnosis: Hypothyroid  Assessment and Plan of Treatment: Resume your Synthroid 88mcg.    Diagnosis: Pacemaker  Assessment and Plan of Treatment: You have a history of Atrial fibrillation, and also have a pacemaker. Please follow up with your cardiologist upon discharge. PRINCIPAL DISCHARGE DIAGNOSIS  Diagnosis: Syncope  Assessment and Plan of Treatment: You presented to the hospital after have a syncopal event  (loss of consciousness/fainting). We admitted you to a telemetry floor to check for any arrhythmias. We also did an echocardiogram to check your heart function. You may have fainted due to a vasovagal episode, which is  when your body has a reaction in which your heart beats too slowly or your blood vessels expand (or both). This can happen for lots of different kinds of reasons. People can have vasovagal syncope if they:  ?Have stress from fear or pain (for example, because they are injured or have blood taken for tests)  ?Stand for too long or are over-tired or overheated  ?Have an unusual reaction to urinating, coughing, or other body functions  Please follow up with your cardiologist Dr. Hawkins, she is expecting you on August 22nd (Thursday) at 3:00PM. Please have your son or any other family member accompany you.      SECONDARY DISCHARGE DIAGNOSES  Diagnosis: Afib  Assessment and Plan of Treatment: You have a history of atrial fibrillation and you take Coumadin. For this you have to have your INR checked regularly to make sure it is therapeutic. please follow up with your PMD/Cardiologist.    Diagnosis: HTN (hypertension)  Assessment and Plan of Treatment: You have a history of hypertension, please resume your current medications.    Diagnosis: High cholesterol  Assessment and Plan of Treatment: Please resume your Atorvastatin 10mg.    Diagnosis: Hypothyroid  Assessment and Plan of Treatment: Resume your Synthroid 88mcg.    Diagnosis: Pacemaker  Assessment and Plan of Treatment: You have a history of Atrial fibrillation, and also have a pacemaker. Please follow up with your cardiologist upon discharge. PRINCIPAL DISCHARGE DIAGNOSIS  Diagnosis: Syncope  Assessment and Plan of Treatment: You presented to the hospital after have a syncopal event  (loss of consciousness/fainting). We admitted you to a telemetry floor to check for any arrhythmias. We also did an echocardiogram which was consistent with your history of atrial fibrillation ,the function of the heart was also normal. You may have fainted due to a vasovagal episode, which is  when your body has a reaction in which your heart beats too slowly or your blood vessels expand (or both). This can happen for lots of different kinds of reasons. People can have vasovagal syncope if they:  *Have stress from fear or pain (for example, because they are injured or have blood taken for tests)  *Stand for too long or are over-tired or overheated  *Have an unusual reaction to urinating, coughing, or other body functions  Please follow up with your cardiologist Dr. Hawkins, she is expecting you on August 22nd (Thursday) at 3:00PM. Please have your son or any other family member accompany you.      SECONDARY DISCHARGE DIAGNOSES  Diagnosis: Afib  Assessment and Plan of Treatment: You have a history of atrial fibrillation and you take Coumadin. For this you have to have your INR checked regularly to make sure it is therapeutic. please follow up with your PMD/Cardiologist.    Diagnosis: HTN (hypertension)  Assessment and Plan of Treatment: You have a history of hypertension, please resume your current medications.    Diagnosis: High cholesterol  Assessment and Plan of Treatment: Please resume your Atorvastatin 10mg.    Diagnosis: Hypothyroid  Assessment and Plan of Treatment: Resume your Synthroid 88mcg.    Diagnosis: Pacemaker  Assessment and Plan of Treatment: You have a history of Atrial fibrillation, and also have a pacemaker. Please follow up with your cardiologist upon discharge. good balance

## 2024-11-25 NOTE — DISCHARGE NOTE PROVIDER - NSDCHHASSISTDEVIC_GEN_ALL_CORE_FT
Addended by: KRYSTAL ESPINAL on: 11/25/2024 01:53 PM     Modules accepted: Orders    
ataxic gait and fall risk